# Patient Record
Sex: FEMALE | Race: WHITE | Employment: OTHER | URBAN - METROPOLITAN AREA
[De-identification: names, ages, dates, MRNs, and addresses within clinical notes are randomized per-mention and may not be internally consistent; named-entity substitution may affect disease eponyms.]

---

## 2021-03-12 PROBLEM — E66.813 OBESITY, CLASS III, BMI 40-49.9 (MORBID OBESITY): Status: ACTIVE | Noted: 2021-03-12

## 2021-11-18 PROBLEM — F50.819 BINGE EATING DISORDER: Status: ACTIVE | Noted: 2021-11-18

## 2023-03-15 PROBLEM — F50.819 BINGE EATING DISORDER: Status: RESOLVED | Noted: 2021-11-18 | Resolved: 2023-03-15

## 2024-07-11 ENCOUNTER — TELEPHONE (OUTPATIENT)
Age: 29
End: 2024-07-11

## 2024-07-11 NOTE — TELEPHONE ENCOUNTER
Pt called in , she stated hat she had changed her last name, but not reflected on my chart. Her last name on her insurance is correct and is now having an issue because  of the mismatch could not get her breast pump. Please kindly reach out to pt. Thank you

## 2024-07-29 ENCOUNTER — TELEPHONE (OUTPATIENT)
Age: 29
End: 2024-07-29

## 2024-07-30 ENCOUNTER — ROUTINE PRENATAL (OUTPATIENT)
Age: 29
End: 2024-07-30
Payer: COMMERCIAL

## 2024-07-30 VITALS — SYSTOLIC BLOOD PRESSURE: 116 MMHG | DIASTOLIC BLOOD PRESSURE: 78 MMHG | BODY MASS INDEX: 45.35 KG/M2 | WEIGHT: 256 LBS

## 2024-07-30 DIAGNOSIS — E66.01 OBESITY, CLASS III, BMI 40-49.9 (MORBID OBESITY) (HCC): ICD-10-CM

## 2024-07-30 DIAGNOSIS — Z34.83 ENCOUNTER FOR SUPERVISION OF OTHER NORMAL PREGNANCY, THIRD TRIMESTER: Primary | ICD-10-CM

## 2024-07-30 DIAGNOSIS — Z3A.36 36 WEEKS GESTATION OF PREGNANCY: ICD-10-CM

## 2024-07-30 LAB
SL AMB  POCT GLUCOSE, UA: NEGATIVE
SL AMB POCT URINE PROTEIN: NEGATIVE

## 2024-07-30 PROCEDURE — PNV: Performed by: STUDENT IN AN ORGANIZED HEALTH CARE EDUCATION/TRAINING PROGRAM

## 2024-07-30 PROCEDURE — 87184 SC STD DISK METHOD PER PLATE: CPT | Performed by: STUDENT IN AN ORGANIZED HEALTH CARE EDUCATION/TRAINING PROGRAM

## 2024-07-30 PROCEDURE — 81002 URINALYSIS NONAUTO W/O SCOPE: CPT | Performed by: STUDENT IN AN ORGANIZED HEALTH CARE EDUCATION/TRAINING PROGRAM

## 2024-07-30 PROCEDURE — 87150 DNA/RNA AMPLIFIED PROBE: CPT | Performed by: STUDENT IN AN ORGANIZED HEALTH CARE EDUCATION/TRAINING PROGRAM

## 2024-07-30 NOTE — PROGRESS NOTES
Pt is here for routine ob visit   No concerns at this time  Urine neg/neg   No LOF,VB  Clayton Calabrese Contractions  +FM   Declined tdap   GBS collected today   Delivery consent signed at previous visit

## 2024-07-30 NOTE — ASSESSMENT & PLAN NOTE
Declines glucose testing. Aware of risk of GDM and potential for  hypoglycemia, including testing and potential NICU admission

## 2024-07-30 NOTE — PROGRESS NOTES
28 y.o.  at 36w3d, here for routine OB visit. Feeling well overall and without concerns. Good FM. Denies LOF, VB, contractions. Denies dysuria, hematuria.     Problem List Items Addressed This Visit          Obstetrics/Gynecology    36 weeks gestation of pregnancy     -precautions reviewed  -birth plan previously filled out without pediatrician, returned to patient to redo. Will also need her personal birth plan signed prior to arrival at the hospital. She will scan in or bring to next visit  -prepregnancy BMI 27 with goal weight gain 15-25#: TWG = #, recommended walking 5+ time per week              Other    Obesity, Class III, BMI 40-49.9 (morbid obesity) (HCC)     Declines glucose testing. Aware of risk of GDM and potential for  hypoglycemia, including testing and potential NICU admission          Other Visit Diagnoses       Encounter for supervision of other normal pregnancy, third trimester    -  Primary    Relevant Orders    POCT urine dip (Completed)    Strep B DNA probe, amplification

## 2024-07-30 NOTE — ASSESSMENT & PLAN NOTE
-precautions reviewed  -birth plan previously filled out without pediatrician, returned to patient to redo. Will also need her personal birth plan signed prior to arrival at the hospital. She will scan in or bring to next visit  -prepregnancy BMI 27 with goal weight gain 15-25#: TWG = #, recommended walking 5+ time per week

## 2024-08-01 LAB — GP B STREP DNA SPEC QL NAA+PROBE: POSITIVE

## 2024-08-02 ENCOUNTER — OFFICE VISIT (OUTPATIENT)
Age: 29
End: 2024-08-02
Payer: COMMERCIAL

## 2024-08-02 VITALS
RESPIRATION RATE: 18 BRPM | OXYGEN SATURATION: 97 % | BODY MASS INDEX: 45.35 KG/M2 | HEIGHT: 63 IN | HEART RATE: 114 BPM | TEMPERATURE: 97.5 F | SYSTOLIC BLOOD PRESSURE: 120 MMHG | DIASTOLIC BLOOD PRESSURE: 80 MMHG

## 2024-08-02 DIAGNOSIS — J06.9 VIRAL URI WITH COUGH: Primary | ICD-10-CM

## 2024-08-02 PROCEDURE — 99213 OFFICE O/P EST LOW 20 MIN: CPT | Performed by: FAMILY MEDICINE

## 2024-08-02 RX ORDER — IPRATROPIUM BROMIDE 21 UG/1
2 SPRAY, METERED NASAL EVERY 12 HOURS
Qty: 30 ML | Refills: 0 | Status: SHIPPED | OUTPATIENT
Start: 2024-08-02

## 2024-08-02 NOTE — PROGRESS NOTES
"[facility] PRIMARY CARE  Ambulatory visit     Name: Kelly Gabriel   YOB: 1995   MRN: [id number]  Encounter Provider: Jean Claude Sanchez MD    Encounter Date: 8/2/2024    ASSESSMENT & PLAN    Assessment & Plan     Sore throat/cough  Started 4-5 days ago, it has stayed the same. Sleep has been an issue since the start of the cough and sore throat.  Currently 36 weeks and 6-day pregnant.   Exam essentially unremarkable.  Patient would like something for treatment of cough, provided Atrovent nasal spray up to 2 times a day as needed.  Avoid sleeping on her back, advised belly breathing to expand all lung fields, may use Vicks topically and lidocaine spray for throat from over-the-counter.  Discussed return parameters              DIAGNOSIS & ORDERS   1. Viral URI with cough  -     ipratropium (ATROVENT) 0.03 % nasal spray; 2 sprays into each nostril every 12 (twelve) hours    FOLLOW-UP PLANS   No follow-ups on file.    Current Medication List:     Current Outpatient Medications:   •  CALCIUM-MAG-VIT C-VIT D PO, Take by mouth, Disp: , Rfl:   •  ipratropium (ATROVENT) 0.03 % nasal spray, 2 sprays into each nostril every 12 (twelve) hours, Disp: 30 mL, Rfl: 0  •  Prenatal MV-Min-Fe Fum-FA-DHA (Prenatal Multivitamin Plus DHA) 27-0.8-250 MG CAPS, 1 capsule p.o. daily, Disp: 30 capsule, Rfl: 12  •  Lancets (onetouch ultrasoft) lancets, Use as instructed (Patient not taking: Reported on 7/2/2024), Disp: 30 each, Rfl: 0  Subjective   History of Present Illness       Kelly Gabriel is here for an office visit.   HPI    Review of Systems    Objective:     /80 (BP Location: Left arm, Patient Position: Sitting, Cuff Size: Standard)   Pulse (!) 114   Temp 97.5 °F (36.4 °C) (Tympanic)   Resp 18   Ht 5' 3\" (1.6 m)   LMP 11/27/2023 (Exact Date)   SpO2 97%   BMI 45.35 kg/m²      Physical Exam  Vitals reviewed.   Constitutional:       General: She is not in acute distress.     Appearance: Normal " appearance. She is not ill-appearing, toxic-appearing or diaphoretic.   HENT:      Head: Normocephalic and atraumatic.      Right Ear: External ear normal.      Left Ear: External ear normal.      Nose: Nose normal.      Mouth/Throat:      Mouth: Mucous membranes are moist. No oral lesions.      Pharynx: Pharyngeal swelling present. No oropharyngeal exudate, posterior oropharyngeal erythema or uvula swelling.      Tonsils: No tonsillar exudate or tonsillar abscesses.   Eyes:      General: No scleral icterus.        Right eye: No discharge.         Left eye: No discharge.      Extraocular Movements: Extraocular movements intact.      Conjunctiva/sclera: Conjunctivae normal.   Cardiovascular:      Rate and Rhythm: Normal rate and regular rhythm.      Pulses: Normal pulses.      Heart sounds: Normal heart sounds.   Pulmonary:      Effort: Pulmonary effort is normal. No respiratory distress.      Breath sounds: Normal breath sounds. No wheezing or rales.   Abdominal:      Palpations: Abdomen is soft.      Tenderness: There is no abdominal tenderness.   Musculoskeletal:         General: No swelling. Normal range of motion.      Cervical back: Normal range of motion.   Skin:     General: Skin is warm and dry.   Neurological:      General: No focal deficit present.      Mental Status: She is alert and oriented to person, place, and time.   Psychiatric:         Mood and Affect: Mood normal.         Behavior: Behavior normal.         Thought Content: Thought content normal.           Jean Claude Sanchez MD  Family Medicine Physician   [facility]        Administrative Statements

## 2024-08-04 LAB
GP B STREP DNA SPEC QL NAA+PROBE: ABNORMAL
GP B STREP DNA SPEC QL NAA+PROBE: ABNORMAL

## 2024-08-05 PROBLEM — B95.1 POSITIVE GBS TEST: Status: ACTIVE | Noted: 2024-08-05

## 2024-08-05 PROBLEM — Z3A.37 37 WEEKS GESTATION OF PREGNANCY: Status: ACTIVE | Noted: 2024-02-13

## 2024-08-07 ENCOUNTER — TELEPHONE (OUTPATIENT)
Age: 29
End: 2024-08-07

## 2024-08-07 PROBLEM — Z3A.38 38 WEEKS GESTATION OF PREGNANCY: Status: ACTIVE | Noted: 2024-02-13

## 2024-08-07 PROBLEM — N93.9 VAGINAL SPOTTING: Status: RESOLVED | Noted: 2024-07-15 | Resolved: 2024-08-07

## 2024-08-07 NOTE — TELEPHONE ENCOUNTER
Pt calling to let office staff know that [employer] had faxed Breast Pump form. [employer] company asked patient to alert staff to ensure receipt. No further questions.

## 2024-08-07 NOTE — PATIENT INSTRUCTIONS
Patient Education     Pregnancy - The Ninth Month   About this topic   It is important for you to learn how to take care of yourself to help you have a healthy baby and safe delivery. It is good to have health care throughout your pregnancy.  The ninth month of your pregnancy starts around week 37 and lasts through delivery. By knowing how far along you are, you can learn what is normal for this stage of your pregnancy and plan for what is next.  General   Your body   During the ninth month of your pregnancy, here are some things you can expect.  You may:  Lose your mucous plug as your cervix starts to get thinner and dilate.  Notice a small amount of streaky red or pink spotting.  Your doctor may discuss stripping your membranes to help the labor progress.  Go into labor any time. Most women deliver their baby between 38 and 42 weeks.  Notice your belly button sticks out. It should go back to normal after you give birth.  Have a bit of extra energy as you get ready for your baby  Notice your breasts are leaking more. This is normal as your body is making the first milk you can feed your baby.  Have tests to check on how your baby is doing. Your doctor will most likely not let you be pregnant for more than 42 weeks.  Not gain any weight this month. Some mothers even lose 1 to 2 pounds (.45 to .9 kg).  Your baby's growth and development:  Your baby has been busy swallowing fluid and building up waste products for their first bowel movement.  Your baby may start sucking their thumb.  They may come out with dry skin, bruises, or a misshapen head. Living in a watery fluid and going through labor is tough on your baby too. These are all normal and will change in the first weeks of life.  Your baby may have lots of hair on their head or not very much at all. Long fingernails are normal.  Your baby is about 20 inches (51 cm) long and weighs about 7 1/2 pounds (3,400 gm). Your baby is about the size of a watermelon.  Things  to Think About   Avoid alcohol, drugs, tobacco products, and second hand smoke.  Talk to your doctor if you plan to travel or get on a plane.  Have your bag packed so you are ready for delivery.  Are you planning a natural childbirth or thinking about an epidural? Know things you can do to help cope with labor pain.  If you are having a boy, decide if you want to have him circumcised.  Be sure the car seat is installed the right way so you are ready to bring your baby home.  When do I need to call the doctor?   Contractions every 5 minutes or more often that do not go away with rest, drinking water, or position changes  Headache that does not go away; blurry vision; seeing spots or halos; increase in swelling in your hands, feet, or face; and pain under your ribs on the right side  Low, dull back pain that does not go away  Pressure in your pelvis that feels like your baby is pushing down  A gush or constant trickle of watery or bloody fluid leaking from your vagina  Little to no movement felt by baby in 2 hours. Your baby should be moving at least 10 times every 2 hours.  Vaginal bleeding with or without pain  Fever of 100.4°F (38°C) or higher  After a car accident, fall, or any trauma to your belly  Having thoughts of harming yourself or others, or do not feel safe at home  Last Reviewed Date   2020-05-06  Consumer Information Use and Disclaimer   This generalized information is a limited summary of diagnosis, treatment, and/or medication information. It is not meant to be comprehensive and should be used as a tool to help the user understand and/or assess potential diagnostic and treatment options. It does NOT include all information about conditions, treatments, medications, side effects, or risks that may apply to a specific patient. It is not intended to be medical advice or a substitute for the medical advice, diagnosis, or treatment of a health care provider based on the health care provider's examination  and assessment of a patient’s specific and unique circumstances. Patients must speak with a health care provider for complete information about their health, medical questions, and treatment options, including any risks or benefits regarding use of medications. This information does not endorse any treatments or medications as safe, effective, or approved for treating a specific patient. UpToDate, Inc. and its affiliates disclaim any warranty or liability relating to this information or the use thereof. The use of this information is governed by the Terms of Use, available at https://www.woltersSportboomuwer.com/en/know/clinical-effectiveness-terms   Copyright   Copyright © 2024 UpToDate, Inc. and its affiliates and/or licensors. All rights reserved.

## 2024-08-12 ENCOUNTER — ROUTINE PRENATAL (OUTPATIENT)
Dept: OBGYN CLINIC | Facility: CLINIC | Age: 29
End: 2024-08-12

## 2024-08-12 VITALS
BODY MASS INDEX: 45.53 KG/M2 | TEMPERATURE: 87 F | DIASTOLIC BLOOD PRESSURE: 80 MMHG | SYSTOLIC BLOOD PRESSURE: 116 MMHG | WEIGHT: 257 LBS | OXYGEN SATURATION: 97 %

## 2024-08-12 DIAGNOSIS — Z3A.38 38 WEEKS GESTATION OF PREGNANCY: ICD-10-CM

## 2024-08-12 DIAGNOSIS — E66.01 SEVERE OBESITY DUE TO EXCESS CALORIES AFFECTING PREGNANCY IN THIRD TRIMESTER (HCC): ICD-10-CM

## 2024-08-12 DIAGNOSIS — Z34.83 ENCOUNTER FOR SUPERVISION OF OTHER NORMAL PREGNANCY, THIRD TRIMESTER: Primary | ICD-10-CM

## 2024-08-12 DIAGNOSIS — O99.213 SEVERE OBESITY DUE TO EXCESS CALORIES AFFECTING PREGNANCY IN THIRD TRIMESTER (HCC): ICD-10-CM

## 2024-08-12 DIAGNOSIS — B95.1 POSITIVE GBS TEST: ICD-10-CM

## 2024-08-12 PROCEDURE — PNV: Performed by: NURSE PRACTITIONER

## 2024-08-12 NOTE — PROGRESS NOTES
Problem   38 Weeks Gestation of Pregnancy    Blood Type: O positive  Pap NILM  GC/CT -  negative  PN1 Labs- nml  28 Week Labs- CBC RPR nml, DECLINES glucose testing. Counseled  TW.3 kg (25 lb)     Genetic screening-  NIPS not done  AFP-  done, incorrect window  Level 1- 24  Level 2-   FG recommended to be done at 28 wks, declined a repeat, financial constraints  NSTs declined a repeat d/t financial constraints  Yellow folder-given  TDAP - declined    Delivery consent- signed.  We discussed that we are a teaching hospital and cannot follow her request to not allow resident physicians participate in her care.  She states she is just concerned about too many people in the room which I told her could be limited to the best of our abilities.  I also explained that videography is not allowed during her labor which she understood.  Vitamin K is declined which she will discuss with the staff on labor and delivery.  Breast pump - ordered, having issues getting it approved. Will get us a copy of her letter  Pediatrician - selected [facility]  L&D forms- signed and submitted  IOL declined today, given info on induction     Perineal massage - reinforced  GBS swab - POSITIVE             38 weeks gestation of pregnancy  She feels well. She denies LOF/CTX/VB. She did not voice any concerns. Discussed fetal kick counting.  She was encouraged to start with her perineal/vaginal massages to prevent lacerations during the labor process.  Overview charting updated today.

## 2024-08-12 NOTE — ASSESSMENT & PLAN NOTE
She feels well. She denies LOF/CTX/VB. She did not voice any concerns. Discussed fetal kick counting.  She was encouraged to start with her perineal/vaginal massages to prevent lacerations during the labor process.  Overview charting updated today.

## 2024-08-13 ENCOUNTER — TELEPHONE (OUTPATIENT)
Age: 29
End: 2024-08-13

## 2024-08-13 NOTE — TELEPHONE ENCOUNTER
Incoming call from patient. Patient states she is receiving run-around regarding breast pump order. Please see scan from 8/7 in media - form needs to be filled out and sent back to Kaylee.     Routing to clinical pool for follow-up.

## 2024-08-19 PROBLEM — Z3A.39 39 WEEKS GESTATION OF PREGNANCY: Status: ACTIVE | Noted: 2024-02-13

## 2024-08-19 NOTE — PATIENT INSTRUCTIONS
Patient Education     Pregnancy - The Ninth Month   About this topic   It is important for you to learn how to take care of yourself to help you have a healthy baby and safe delivery. It is good to have health care throughout your pregnancy.  The ninth month of your pregnancy starts around week 37 and lasts through delivery. By knowing how far along you are, you can learn what is normal for this stage of your pregnancy and plan for what is next.  General   Your body   During the ninth month of your pregnancy, here are some things you can expect.  You may:  Lose your mucous plug as your cervix starts to get thinner and dilate.  Notice a small amount of streaky red or pink spotting.  Your doctor may discuss stripping your membranes to help the labor progress.  Go into labor any time. Most women deliver their baby between 38 and 42 weeks.  Notice your belly button sticks out. It should go back to normal after you give birth.  Have a bit of extra energy as you get ready for your baby  Notice your breasts are leaking more. This is normal as your body is making the first milk you can feed your baby.  Have tests to check on how your baby is doing. Your doctor will most likely not let you be pregnant for more than 42 weeks.  Not gain any weight this month. Some mothers even lose 1 to 2 pounds (.45 to .9 kg).  Your baby's growth and development:  Your baby has been busy swallowing fluid and building up waste products for their first bowel movement.  Your baby may start sucking their thumb.  They may come out with dry skin, bruises, or a misshapen head. Living in a watery fluid and going through labor is tough on your baby too. These are all normal and will change in the first weeks of life.  Your baby may have lots of hair on their head or not very much at all. Long fingernails are normal.  Your baby is about 20 inches (51 cm) long and weighs about 7 1/2 pounds (3,400 gm). Your baby is about the size of a watermelon.  Things  to Think About   Avoid alcohol, drugs, tobacco products, and second hand smoke.  Talk to your doctor if you plan to travel or get on a plane.  Have your bag packed so you are ready for delivery.  Are you planning a natural childbirth or thinking about an epidural? Know things you can do to help cope with labor pain.  If you are having a boy, decide if you want to have him circumcised.  Be sure the car seat is installed the right way so you are ready to bring your baby home.  When do I need to call the doctor?   Contractions every 5 minutes or more often that do not go away with rest, drinking water, or position changes  Headache that does not go away; blurry vision; seeing spots or halos; increase in swelling in your hands, feet, or face; and pain under your ribs on the right side  Low, dull back pain that does not go away  Pressure in your pelvis that feels like your baby is pushing down  A gush or constant trickle of watery or bloody fluid leaking from your vagina  Little to no movement felt by baby in 2 hours. Your baby should be moving at least 10 times every 2 hours.  Vaginal bleeding with or without pain  Fever of 100.4°F (38°C) or higher  After a car accident, fall, or any trauma to your belly  Having thoughts of harming yourself or others, or do not feel safe at home  Last Reviewed Date   2020-05-06  Consumer Information Use and Disclaimer   This generalized information is a limited summary of diagnosis, treatment, and/or medication information. It is not meant to be comprehensive and should be used as a tool to help the user understand and/or assess potential diagnostic and treatment options. It does NOT include all information about conditions, treatments, medications, side effects, or risks that may apply to a specific patient. It is not intended to be medical advice or a substitute for the medical advice, diagnosis, or treatment of a health care provider based on the health care provider's examination  and assessment of a patient’s specific and unique circumstances. Patients must speak with a health care provider for complete information about their health, medical questions, and treatment options, including any risks or benefits regarding use of medications. This information does not endorse any treatments or medications as safe, effective, or approved for treating a specific patient. UpToDate, Inc. and its affiliates disclaim any warranty or liability relating to this information or the use thereof. The use of this information is governed by the Terms of Use, available at https://www.woltersNoteWagonuwer.com/en/know/clinical-effectiveness-terms   Copyright   Copyright © 2024 UpToDate, Inc. and its affiliates and/or licensors. All rights reserved.

## 2024-08-20 ENCOUNTER — ROUTINE PRENATAL (OUTPATIENT)
Age: 29
End: 2024-08-20
Payer: COMMERCIAL

## 2024-08-20 VITALS
DIASTOLIC BLOOD PRESSURE: 78 MMHG | HEIGHT: 63 IN | SYSTOLIC BLOOD PRESSURE: 126 MMHG | BODY MASS INDEX: 45.75 KG/M2 | WEIGHT: 258.2 LBS | HEART RATE: 109 BPM | OXYGEN SATURATION: 97 %

## 2024-08-20 DIAGNOSIS — B95.1 POSITIVE GBS TEST: ICD-10-CM

## 2024-08-20 DIAGNOSIS — O99.210 OBESITY AFFECTING PREGNANCY, ANTEPARTUM, UNSPECIFIED OBESITY TYPE: ICD-10-CM

## 2024-08-20 DIAGNOSIS — Z34.83 PRENATAL CARE, SUBSEQUENT PREGNANCY IN THIRD TRIMESTER: Primary | ICD-10-CM

## 2024-08-20 DIAGNOSIS — Z3A.39 39 WEEKS GESTATION OF PREGNANCY: ICD-10-CM

## 2024-08-20 LAB
SL AMB  POCT GLUCOSE, UA: ABNORMAL
SL AMB POCT URINE PROTEIN: ABNORMAL

## 2024-08-20 PROCEDURE — PNV: Performed by: NURSE PRACTITIONER

## 2024-08-20 PROCEDURE — 81002 URINALYSIS NONAUTO W/O SCOPE: CPT | Performed by: NURSE PRACTITIONER

## 2024-08-20 NOTE — PROGRESS NOTES
Problem   39 Weeks Gestation of Pregnancy    Blood Type: O positive  Pap NILM  GC/CT -  negative  PN1 Labs- nml  28 Week Labs- CBC RPR nml, DECLINES glucose testing. Counseled  TW.9 kg (26 lb 3.2 oz)     Genetic screening-  NIPS not done  AFP-  done, incorrect window  Level 1- 24  Level 2-   FG recommended to be done at 28 wks, declined a repeat, financial constraints  NSTs declined a repeat d/t financial constraints  Yellow folder-given  TDAP - declined    Delivery consent- signed.  We discussed that we are a teaching hospital and cannot follow her request to not allow resident physicians participate in her care.  She states she is just concerned about too many people in the room which I told her could be limited to the best of our abilities.  I also explained that videography is not allowed during her labor which she understood.  Vitamin K is declined which she will discuss with the staff on labor and delivery.  Breast pump - ordered, having issues getting it approved. Resubmitted.  Pediatrician - selected [facility]  L&D forms- signed and submitted  IOL declined again today     Perineal massage - reinforced  GBS swab - POSITIVE             39 weeks gestation of pregnancy  Here with FOB. She feels well. She denies LOF/CTX/VB. She did not voice any concerns. Discussed fetal kick counting.  She was encouraged to continue with her perineal/vaginal massages to prevent lacerations during the labor process. Advised to increase fluids on the warmer days, concentrated urine today.    Overview charting updated today.

## 2024-08-20 NOTE — ASSESSMENT & PLAN NOTE
Here with FOB. She feels well. She denies LOF/CTX/VB. She did not voice any concerns. Discussed fetal kick counting.  She was encouraged to continue with her perineal/vaginal massages to prevent lacerations during the labor process. Advised to increase fluids on the warmer days, concentrated urine today.    Overview charting updated today.

## 2024-08-26 ENCOUNTER — HOSPITAL ENCOUNTER (OUTPATIENT)
Facility: HOSPITAL | Age: 29
Discharge: HOME | End: 2024-08-26
Attending: STUDENT IN AN ORGANIZED HEALTH CARE EDUCATION/TRAINING PROGRAM | Admitting: STUDENT IN AN ORGANIZED HEALTH CARE EDUCATION/TRAINING PROGRAM
Payer: COMMERCIAL

## 2024-08-26 ENCOUNTER — NURSE TRIAGE (OUTPATIENT)
Dept: OTHER | Facility: OTHER | Age: 29
End: 2024-08-26

## 2024-08-26 VITALS
WEIGHT: 258 LBS | HEIGHT: 63 IN | TEMPERATURE: 98.2 F | HEART RATE: 96 BPM | BODY MASS INDEX: 45.71 KG/M2 | OXYGEN SATURATION: 97 % | RESPIRATION RATE: 18 BRPM | DIASTOLIC BLOOD PRESSURE: 82 MMHG | SYSTOLIC BLOOD PRESSURE: 124 MMHG

## 2024-08-26 PROBLEM — Z3A.40 40 WEEKS GESTATION OF PREGNANCY: Status: ACTIVE | Noted: 2024-02-13

## 2024-08-26 PROCEDURE — 76815 OB US LIMITED FETUS(S): CPT | Performed by: STUDENT IN AN ORGANIZED HEALTH CARE EDUCATION/TRAINING PROGRAM

## 2024-08-26 PROCEDURE — NC001 PR NO CHARGE: Performed by: STUDENT IN AN ORGANIZED HEALTH CARE EDUCATION/TRAINING PROGRAM

## 2024-08-26 PROCEDURE — 76815 OB US LIMITED FETUS(S): CPT

## 2024-08-26 PROCEDURE — 99213 OFFICE O/P EST LOW 20 MIN: CPT

## 2024-08-26 PROCEDURE — 59025 FETAL NON-STRESS TEST: CPT

## 2024-08-26 NOTE — PROGRESS NOTES
"L&D Triage Note - OB/GYN  Kelly Gabriel 28 y.o. female MRN: [id number]  Unit/Bed#:  TRIAGE  Encounter: [id number]        Patient is seen by [facility]    ASSESSMENT/PLAN  Kelly Gabriel is a 28 y.o.  at 40w2d who presents with contractions, not currently in labor.      1) r/o labor  - SVE minimally changed at 3-50/-3, after membrane stripping and 2 hour recheck  - Patient would like to avoid induction of labor, did emphasize that there is minimal benefit to staying pregnant past 40 weeks  - Vitals wnl  - NST reactive, GABRIEL 19 cm      2)  Discharge instructions  - Patient instructed to call if experiencing worsening contractions, vaginal bleeding, loss of fluid or decreased fetal movement.  - Will follow up with OBGYN in office this Wednesday    D/w Dr. Devine  ______________    SUBJECTIVE    SANDRA: Estimated Date of Delivery: 24    HPI:  28 y.o.  40w2d presents with complaint of worsening contractions. Feels like they were initially about 6-10 min apart earlier today but now feel around 5 min apart.     Contractions: yes  Leakage of fluid: no  Vaginal Bleeding: no  Fetal movement: present    Her obstetrical history is significant for GBS positive, BMI 45    ROS:  Constitutional: Negative  Respiratory: Negative  Cardiovascular: Negative    Gastrointestinal: Negative    Physical Exam  GEN: Well appearing, no apparent distress   ABD: Gravid, soft  SVE: Cervical Dilation: 3-4  Cervical Effacement: 50  Cervical Consistency: Medium  Fetal Station: -3  Presentation: Vertex  Method: Manual  OB Examiner: Xochitl    OBJECTIVE:  /82 (BP Location: Right arm)   Pulse 96   Temp 98.2 °F (36.8 °C) (Oral)   Resp 18   Ht 5' 3\" (1.6 m)   Wt 117 kg (258 lb)   LMP 2023 (Exact Date)   SpO2 97%   BMI 45.70 kg/m²   Body mass index is 45.7 kg/m².  Labs: No results found for this or any previous visit (from the past 24 hour(s)).      SVE:  Cervical Dilation: 3-4  Cervical " "Effacement: 50  Cervical Consistency: Medium  Fetal Station: -3  Presentation: Vertex  Method: Manual  OB Examiner: Xochitl    FHT:  Baseline Rate (FHR): 135 bpm  Variability: Moderate  Accelerations: 15 x 15 or greater  Decelerations: None    TOCO:   Contraction Frequency (minutes): 2-5 (inverted)  Contraction Duration (seconds): 60-90  Contraction Intensity: Mild    IMAGING:        TAUS  4 Quadrant GABRIEL  GABRIEL Q1 (cm): 5.5 cm  GABRIEL Q2 (cm): 5.2 cm  GABRIEL Q3 (cm): 7.7 cm  GABRIEL Q4 (cm): 0.7 cm  GABRIEL TOTAL (cm): 19.1 cm    Mercedes Leung MD  OB/GYN PGY-4  8/26/2024  7:37 AM      Portions of the record may have been created with voice recognition software.  Occasional wrong word or \"sound a like\" substitutions may have occurred due to the inherent limitations of voice recognition software.  Read the chart carefully and recognize, using context, where substitutions have occurred    "

## 2024-08-26 NOTE — TELEPHONE ENCOUNTER
"Reason for Disposition   [1] First baby (primipara) AND [2] contractions < 6 minutes apart  AND [3] present 2 hours    Answer Assessment - Initial Assessment Questions  1. ONSET: \"When did the symptoms begin?\"         Saturday morning    2. CONTRACTIONS: \"Describe the contractions that you are having.\" (e.g., duration, frequency, regularity, severity)      6-8 minutes over the last hour. Lasting 30 seconds. Since Saturday morning having contractions about every 11 minutes.     3. SANDRA: \"What date are you expecting to deliver?\"      08/24/2024    4. PARITY: \"Have you had a baby before?\" If Yes, ask: \"How long did the labor last?\"      First baby    5. FETAL MOVEMENT: \"Has the baby's movement decreased or changed significantly from normal?\"      Good FM    6. OTHER SYMPTOMS: \"Do you have any other symptoms?\" (e.g., leaking fluid from vagina, vaginal bleeding, fever, hand/facial swelling)      Mucous plug coming out. Feeling lots of vaginal pressure.    Sent ESC to on-call provider for recommendations. Provider recommended triage for evaluation. Reviewed recommendations with patient and she is agreeable in plan. She will arrive in 1 hour.    Protocols used: Pregnancy - Labor-ADULT-AH    "

## 2024-08-26 NOTE — PROCEDURES
Kelly Gabriel, a  at 40w2d with an SANDRA of 2024, by Ultrasound, [facility] for the following procedure(s): $Procedure Type: GABRIEL, NST]    Nonstress Test  Reason for NST: Routine  Variability: Moderate  Decelerations: None  Accelerations: Yes  Acoustic Stimulator: No  Baseline: 135 BPM  Uterine Irritability: No  Contractions: Irregular  Contraction Frequency (minutes): 5 min    4 Quadrant GABRIEL  GABRIEL Q1 (cm): 5.5 cm  GABRIEL Q2 (cm): 5.2 cm  GABRIEL Q3 (cm): 7.7 cm  GABRIEL Q4 (cm): 0.7 cm  GABRIEL TOTAL (cm): 19.1 cm              Interpretation  Nonstress Test Interpretation: Reactive  Overall Impression: Reassuring

## 2024-08-27 ENCOUNTER — ANESTHESIA (INPATIENT)
Dept: ANESTHESIOLOGY | Facility: HOSPITAL | Age: 29
End: 2024-08-27
Payer: COMMERCIAL

## 2024-08-27 ENCOUNTER — ANESTHESIA EVENT (INPATIENT)
Dept: ANESTHESIOLOGY | Facility: HOSPITAL | Age: 29
End: 2024-08-27
Payer: COMMERCIAL

## 2024-08-27 ENCOUNTER — HOSPITAL ENCOUNTER (INPATIENT)
Facility: HOSPITAL | Age: 29
LOS: 2 days | Discharge: HOME | End: 2024-08-29
Attending: STUDENT IN AN ORGANIZED HEALTH CARE EDUCATION/TRAINING PROGRAM | Admitting: STUDENT IN AN ORGANIZED HEALTH CARE EDUCATION/TRAINING PROGRAM
Payer: COMMERCIAL

## 2024-08-27 ENCOUNTER — NURSE TRIAGE (OUTPATIENT)
Age: 29
End: 2024-08-27

## 2024-08-27 DIAGNOSIS — O47.9 UTERINE CONTRACTIONS AT GREATER THAN 20 WEEKS OF GESTATION: ICD-10-CM

## 2024-08-27 PROBLEM — O14.03 MILD PRE-ECLAMPSIA IN THIRD TRIMESTER: Status: ACTIVE | Noted: 2024-08-27

## 2024-08-27 PROBLEM — R03.0 ELEVATED BP WITHOUT DIAGNOSIS OF HYPERTENSION: Status: ACTIVE | Noted: 2024-08-27

## 2024-08-27 LAB
ABO GROUP BLD: NORMAL
ALBUMIN SERPL BCG-MCNC: 3.7 G/DL (ref 3.5–5)
ALP SERPL-CCNC: 114 U/L (ref 34–104)
ALT SERPL W P-5'-P-CCNC: 12 U/L (ref 7–52)
ANION GAP SERPL CALCULATED.3IONS-SCNC: 12 MMOL/L (ref 4–13)
AST SERPL W P-5'-P-CCNC: 18 U/L (ref 13–39)
BASE EXCESS BLDCOA CALC-SCNC: -6.8 MMOL/L (ref 3–11)
BASE EXCESS BLDCOV CALC-SCNC: -5.8 MMOL/L (ref 1–9)
BILIRUB SERPL-MCNC: 0.53 MG/DL (ref 0.2–1)
BLD GP AB SCN SERPL QL: NEGATIVE
BUN SERPL-MCNC: 17 MG/DL (ref 5–25)
CALCIUM SERPL-MCNC: 9.2 MG/DL (ref 8.4–10.2)
CHLORIDE SERPL-SCNC: 104 MMOL/L (ref 96–108)
CO2 SERPL-SCNC: 18 MMOL/L (ref 21–32)
CREAT SERPL-MCNC: 0.74 MG/DL (ref 0.6–1.3)
CREAT UR-MCNC: 355.2 MG/DL
ERYTHROCYTE [DISTWIDTH] IN BLOOD BY AUTOMATED COUNT: 13.2 % (ref 11.6–15.1)
GFR SERPL CREATININE-BSD FRML MDRD: 109 ML/MIN/1.73SQ M
GLUCOSE SERPL-MCNC: 75 MG/DL (ref 65–140)
GLUCOSE SERPL-MCNC: 90 MG/DL (ref 65–140)
HCO3 BLDCOA-SCNC: 22 MMOL/L (ref 17.3–27.3)
HCO3 BLDCOV-SCNC: 20 MMOL/L (ref 12.2–28.6)
HCT VFR BLD AUTO: 41 % (ref 34.8–46.1)
HGB BLD-MCNC: 13.9 G/DL (ref 11.5–15.4)
HOLD SPECIMEN: NORMAL
MCH RBC QN AUTO: 28.7 PG (ref 26.8–34.3)
MCHC RBC AUTO-ENTMCNC: 33.9 G/DL (ref 31.4–37.4)
MCV RBC AUTO: 85 FL (ref 82–98)
O2 CT VFR BLDCOA CALC: 3.2 ML/DL
OXYHGB MFR BLDCOA: 14.1 %
OXYHGB MFR BLDCOV: 45.6 %
PCO2 BLDCOA: 57 MM[HG] (ref 30–60)
PCO2 BLDCOV: 40.3 MM HG (ref 27–43)
PH BLDCOA: 7.21 [PH] (ref 7.23–7.43)
PH BLDCOV: 7.31 [PH] (ref 7.19–7.49)
PLATELET # BLD AUTO: 279 THOUSANDS/UL (ref 149–390)
PMV BLD AUTO: 10.2 FL (ref 8.9–12.7)
PO2 BLDCOA: 11.4 MM HG (ref 5–25)
PO2 BLDCOV: 21.9 MM HG (ref 15–45)
POTASSIUM SERPL-SCNC: 4 MMOL/L (ref 3.5–5.3)
PROT SERPL-MCNC: 6.6 G/DL (ref 6.4–8.4)
PROT UR-MCNC: 239 MG/DL
PROT/CREAT UR: 0.7 MG/G{CREAT} (ref 0–0.1)
RBC # BLD AUTO: 4.84 MILLION/UL (ref 3.81–5.12)
RH BLD: POSITIVE
SAO2 % BLDCOV: 10.2 ML/DL
SODIUM SERPL-SCNC: 134 MMOL/L (ref 135–147)
SPECIMEN EXPIRATION DATE: NORMAL
TREPONEMA PALLIDUM IGG+IGM AB [PRESENCE] IN SERUM OR PLASMA BY IMMUNOASSAY: NORMAL
WBC # BLD AUTO: 13.43 THOUSAND/UL (ref 4.31–10.16)

## 2024-08-27 PROCEDURE — 82570 ASSAY OF URINE CREATININE: CPT

## 2024-08-27 PROCEDURE — NC001 PR NO CHARGE: Performed by: STUDENT IN AN ORGANIZED HEALTH CARE EDUCATION/TRAINING PROGRAM

## 2024-08-27 PROCEDURE — 0HQ9XZZ REPAIR PERINEUM SKIN, EXTERNAL APPROACH: ICD-10-PCS | Performed by: STUDENT IN AN ORGANIZED HEALTH CARE EDUCATION/TRAINING PROGRAM

## 2024-08-27 PROCEDURE — 86901 BLOOD TYPING SEROLOGIC RH(D): CPT

## 2024-08-27 PROCEDURE — 85027 COMPLETE CBC AUTOMATED: CPT

## 2024-08-27 PROCEDURE — 4A1HXCZ MONITORING OF PRODUCTS OF CONCEPTION, CARDIAC RATE, EXTERNAL APPROACH: ICD-10-PCS | Performed by: STUDENT IN AN ORGANIZED HEALTH CARE EDUCATION/TRAINING PROGRAM

## 2024-08-27 PROCEDURE — 86900 BLOOD TYPING SEROLOGIC ABO: CPT

## 2024-08-27 PROCEDURE — 59400 OBSTETRICAL CARE: CPT | Performed by: STUDENT IN AN ORGANIZED HEALTH CARE EDUCATION/TRAINING PROGRAM

## 2024-08-27 PROCEDURE — 86780 TREPONEMA PALLIDUM: CPT

## 2024-08-27 PROCEDURE — 99213 OFFICE O/P EST LOW 20 MIN: CPT

## 2024-08-27 PROCEDURE — 82805 BLOOD GASES W/O2 SATURATION: CPT | Performed by: STUDENT IN AN ORGANIZED HEALTH CARE EDUCATION/TRAINING PROGRAM

## 2024-08-27 PROCEDURE — 86850 RBC ANTIBODY SCREEN: CPT

## 2024-08-27 PROCEDURE — 80053 COMPREHEN METABOLIC PANEL: CPT

## 2024-08-27 PROCEDURE — 82948 REAGENT STRIP/BLOOD GLUCOSE: CPT

## 2024-08-27 PROCEDURE — 3E0R3BZ INTRODUCTION OF ANESTHETIC AGENT INTO SPINAL CANAL, PERCUTANEOUS APPROACH: ICD-10-PCS | Performed by: STUDENT IN AN ORGANIZED HEALTH CARE EDUCATION/TRAINING PROGRAM

## 2024-08-27 PROCEDURE — 84156 ASSAY OF PROTEIN URINE: CPT

## 2024-08-27 RX ORDER — BUPIVACAINE HYDROCHLORIDE 2.5 MG/ML
30 INJECTION, SOLUTION EPIDURAL; INFILTRATION; INTRACAUDAL ONCE AS NEEDED
Status: DISCONTINUED | OUTPATIENT
Start: 2024-08-27 | End: 2024-08-28

## 2024-08-27 RX ORDER — CLINDAMYCIN PHOSPHATE 900 MG/50ML
900 INJECTION, SOLUTION INTRAVENOUS EVERY 8 HOURS
Status: DISCONTINUED | OUTPATIENT
Start: 2024-08-27 | End: 2024-08-28

## 2024-08-27 RX ORDER — OXYTOCIN/RINGER'S LACTATE 30/500 ML
1-30 PLASTIC BAG, INJECTION (ML) INTRAVENOUS
Status: DISCONTINUED | OUTPATIENT
Start: 2024-08-27 | End: 2024-08-28

## 2024-08-27 RX ORDER — ONDANSETRON 2 MG/ML
4 INJECTION INTRAMUSCULAR; INTRAVENOUS EVERY 6 HOURS PRN
Status: DISCONTINUED | OUTPATIENT
Start: 2024-08-27 | End: 2024-08-28

## 2024-08-27 RX ORDER — SODIUM CHLORIDE, SODIUM LACTATE, POTASSIUM CHLORIDE, CALCIUM CHLORIDE 600; 310; 30; 20 MG/100ML; MG/100ML; MG/100ML; MG/100ML
125 INJECTION, SOLUTION INTRAVENOUS CONTINUOUS
Status: DISCONTINUED | OUTPATIENT
Start: 2024-08-27 | End: 2024-08-28

## 2024-08-27 RX ORDER — LIDOCAINE HYDROCHLORIDE AND EPINEPHRINE 15; 5 MG/ML; UG/ML
INJECTION, SOLUTION EPIDURAL AS NEEDED
Status: DISCONTINUED | OUTPATIENT
Start: 2024-08-27 | End: 2024-08-29 | Stop reason: HOSPADM

## 2024-08-27 RX ADMIN — CLINDAMYCIN PHOSPHATE 900 MG: 900 INJECTION, SOLUTION INTRAVENOUS at 13:54

## 2024-08-27 RX ADMIN — LIDOCAINE HYDROCHLORIDE AND EPINEPHRINE 3 ML: 15; 5 INJECTION, SOLUTION EPIDURAL at 18:04

## 2024-08-27 RX ADMIN — SODIUM CHLORIDE, SODIUM LACTATE, POTASSIUM CHLORIDE, AND CALCIUM CHLORIDE 125 ML/HR: .6; .31; .03; .02 INJECTION, SOLUTION INTRAVENOUS at 13:03

## 2024-08-27 RX ADMIN — SODIUM CHLORIDE, SODIUM LACTATE, POTASSIUM CHLORIDE, AND CALCIUM CHLORIDE 125 ML/HR: .6; .31; .03; .02 INJECTION, SOLUTION INTRAVENOUS at 15:10

## 2024-08-27 RX ADMIN — ROPIVACAINE HYDROCHLORIDE: 2 INJECTION, SOLUTION EPIDURAL; INFILTRATION at 18:05

## 2024-08-27 RX ADMIN — OXYTOCIN 2 MILLI-UNITS/MIN: 10 INJECTION INTRAVENOUS at 14:47

## 2024-08-27 RX ADMIN — CLINDAMYCIN PHOSPHATE 900 MG: 900 INJECTION, SOLUTION INTRAVENOUS at 20:30

## 2024-08-27 RX ADMIN — ONDANSETRON 4 MG: 2 INJECTION INTRAMUSCULAR; INTRAVENOUS at 16:21

## 2024-08-27 NOTE — ASSESSMENT & PLAN NOTE
40 weeks gestation of pregnancy  Contractions every 5-7 mins    PLAN  - Admit to L&D  for labor   -CBC, RPR, Type and screen  -Clear liquid diet  -SVE: 4/90/-2  -GBS status: positive; IV Clindamycin 900 mg  -Postpartum contraception plan: discuss  -Analgesia at maternal request  -Expectant management, likely augmentation with pitocin

## 2024-08-27 NOTE — ANESTHESIA PROCEDURE NOTES
Epidural Block    Patient location during procedure: OB/L&D  Start time: 8/27/2024 6:03 PM  Reason for block: procedure for pain  Staffing  Performed by: Martin Kline CRNA  Authorized by: Martin Goldberg MD    Preanesthetic Checklist  Completed: patient identified, IV checked, site marked, risks and benefits discussed, surgical consent, monitors and equipment checked, pre-op evaluation and timeout performed  Epidural  Patient position: sitting  Prep: ChloraPrep  Sedation Level: no sedation  Patient monitoring: frequent blood pressure checks, continuous pulse oximetry and heart rate  Approach: midline  Location: lumbar, L2-3  Injection technique: YUSUF air  Needle  Needle type: Tuohy   Needle gauge: 17 G  Needle insertion depth: 8 cm  Catheter type: multi-orifice  Catheter size: 19 G  Catheter at skin depth: 13 cm  Catheter securement method: stabilization device and clear occlusive dressing  Test dose: negative  Assessment  Sensory level: T10  Number of attempts: 1negative aspiration for CSF, negative aspiration for heme and no paresthesia on injection  patient tolerated the procedure well with no immediate complications

## 2024-08-27 NOTE — TELEPHONE ENCOUNTER
"40 weeks 3 days, EDC 8/24/24   Came home from L&D yesterday @ 7:30 AM, since yesterday has noticed yellow tinted discharge on min pad, patient states she is not sure if this is amniotic fluid or not.  Contractions 6-7 minutes apart all night, lasting few seconds and longer ( not timing) and contractions are mild to moderate severity. Patient states she is exhausted and up all night. Baby has been moving well. No bleeding.  Denies fever but feels hot and sweaty.  She states she doesn't want to be induced but concerned about bag of fluid may be ruptured. Advised to go to triage for evaluation.     [facility] Dr Brunson  [facility] L&D Charge     Reason for Disposition   Leakage of fluid from vagina    Additional Information   Leakage of fluid (trickle, gush) from the vagina    Answer Assessment - Initial Assessment Questions  1. DISCHARGE: \"Describe the discharge.\" (e.g., white, yellow, green, gray, foamy, cottage cheese-like)      Using mini pad has yellow fluid discharge that is not urine  2. ODOR: \"Is there a bad odor?\"      No odor  3. ONSET: \"When did the discharge begin?\"      Vaginal discharge started yesterday about   4. RASH: \"Is there a rash in that area?\" If Yes, ask: \"Describe it.\" (e.g., redness, blisters, sores, bumps)      denies  5. ABDOMINAL PAIN: \"Are you having any abdominal pain?\" If Yes, ask: \"What does it feel like?\" (e.g., crampy, dull, intermittent, constant)       Contractions are mild to moderate, severity changes  6. ABDOMINAL PAIN SEVERITY: If present, ask: \"How bad is it?\"  (e.g., Scale 1-10; mild, moderate, or severe)    - MILD (1-3): doesn't interfere with normal activities, abdomen soft and not tender to touch     - MODERATE (4-7): interferes with normal activities or awakens from sleep, tender to touch     - SEVERE (8-10): excruciating pain, doubled over, unable to do any normal activities      6-7/10 at times  7. CAUSE: \"What do you think is causing the discharge?\"      unsure  8. OTHER SYMPTOMS: \"Do " "you have any other symptoms?\" (e.g., fever, itching, vaginal bleeding, pain with urination)      Feels bladder urgency   9. SANDRA: \"What date are you expecting to deliver?\"       8/24/24  10. PREGNANCY: \"How many weeks pregnant are you?\"        40 weeks 3 days    Protocols used: Pregnancy - Vaginal Discharge-ADULT-[place], Pregnancy - Rupture of Membranes-ADULT-[place]    "

## 2024-08-27 NOTE — H&P
H & P- Obstetrics   Kelly Gabriel 28 y.o. female MRN: [id number]  Unit/Bed#:  Encounter: [id number]      Assessment/Plan:    Kelly is a 28 y.o.  at 40w3d admitted for labor    SVE: Cervical Dilation: 4  Cervical Effacement: 90  Cervical Consistency: Soft  Fetal Station: -2  OB Examiner: CHARISSA    Positive GBS test  Assessment & Plan  Clindamycin due to PCN allergy    * Uterine contractions  Assessment & Plan  40 weeks gestation of pregnancy  Contractions every 5-7 mins    PLAN  - Admit to L&D  for labor   -CBC, RPR, Type and screen  -Clear liquid diet  -SVE: /-2  -GBS status: positive; IV Clindamycin 900 mg  -Postpartum contraception plan: discuss  -Analgesia at maternal request  -Expectant management, likely augmentation with pitocin           Patient of: [facility]  This patient will be an INPATIENT  and I certify the anticipated length of stay is >2 Midnights  Discussed with Dr. Brunson      SUBJECTIVE:    Chief Complaint: contractions,vaginal discharge    HPI: Kelly Gabriel is a 28 y.o.  with an SANDRA of 2024, by Ultrasound at 40w3d who is being admitted for labor with likely SROM meconium. She complains of uterine contractions, occurring every 5-7 minutes, has light LOF that is yellow tinged, and reports no VB. FM present. This pregnancy is complicated by positive GBS, elevated BMI. All other review of systems is negative.       Pregnancy Plan:  Pregnancy: [place]  Support person: Howard Gabriel     Delivery Plans  Planned delivery location: [facility] L&D  Acceptable blood products: All     Post-Delivery Plans  Feeding intentions: Breast Milk      Patient Active Problem List   Diagnosis    Obesity, Class III, BMI 40-49.9 (morbid obesity) (HCC)    Fatigue    Gastroesophageal reflux disease without esophagitis    Irritable bowel syndrome    Multiple benign nevi without atypia    40 weeks gestation of pregnancy    Severe obesity due to excess calories affecting pregnancy in third  trimester (HCC)    Obesity affecting pregnancy, antepartum, unspecified obesity type    Cervical polyp    Positive GBS test    Uterine contractions       OB History    Para Term  AB Living   2 0 0 0 1 0   SAB IAB Ectopic Multiple Live Births   1              # Outcome Date GA Lbr Garth/2nd Weight Sex Type Anes PTL Lv   2 Current            1 2019 4w0d              Past Medical History:   Diagnosis Date    Abnormal Pap smear of cervix     ADHD     Anxiety     Binge eating disorder 2021    Depression     Elbow fracture     Miscarriage 2018    Morbid obesity with BMI of 45.0-49.9, adult (HCC)     Torn ACL        Past Surgical History:   Procedure Laterality Date    ELBOW SURGERY Left     UPPER GASTROINTESTINAL ENDOSCOPY      found hernia        Social History     Tobacco Use    Smoking status: Former     Current packs/day: 0.00     Average packs/day: 0.3 packs/day for 10.0 years (2.5 ttl pk-yrs)     Types: Cigarettes     Start date: 3/22/2009     Quit date: 3/22/2019     Years since quittin.4    Smokeless tobacco: Never   Substance Use Topics    Alcohol use: Not Currently     Comment: socially- very rarely.       Allergies   Allergen Reactions    Penicillins Other (See Comments)     Per pt states was told this when younger           Medications Prior to Admission:     Prenatal MV-Min-Fe Fum-FA-DHA (Prenatal Multivitamin Plus DHA) 27-0.8-250 MG CAPS    CALCIUM-MAG-VIT C-VIT D PO    ipratropium (ATROVENT) 0.03 % nasal spray    Lancets (onetouch ultrasoft) lancets        OBJECTIVE:  Vitals:  Temp:  [98.5 °F (36.9 °C)] 98.5 °F (36.9 °C)  HR:  [93] 93  Resp:  [18] 18  BP: (139)/(84) 139/84  There is no height or weight on file to calculate BMI.     Physical Exam:  General: Well appearing, no distress  Respiratory: Unlabored breathing  Cardiovascular: Regular rate.  Abdomen: Soft, gravid, nontender  Fundal Height: Appropriate for gestational age.  Extremities: Warm and well perfused.  Non  "tender.  Psychiatric: Behavioral normal    Speculum exam:  Normal external genitalia, pooling of yellow thin fluid, nitrazine positive, ferning negative      FHT:  Baseline Rate (FHR): 145 bpm  Variability: Moderate  Decelerations: (!) Late    TOCO:   Contraction Frequency (minutes): 5-7  Contraction Duration (seconds): 60-90  Contraction Intensity: Mild      Prenatal Labs:   Blood Type:   Lab Results   Component Value Date/Time    ABO Grouping O 02/15/2024 01:29 PM   D (Rh type):   Lab Results   Component Value Date/Time    Rh Factor Positive 02/15/2024 01:29 PM     HCT/HGB:   Lab Results   Component Value Date/Time    Hematocrit 41.0 08/27/2024 12:12 PM    Hemoglobin 13.9 08/27/2024 12:12 PM    Platelets:   Lab Results   Component Value Date/Time    Platelets 279 08/27/2024 12:12 PM   3 hour GTT: none   Rubella:   Lab Results   Component Value Date/Time    Rubella IgG Quant 33.2 02/15/2024 01:29 PM   VDRL/RPR: Non reactive  Hep B:   Lab Results   Component Value Date/Time    Hepatitis B Surface Ag Non-reactive 02/15/2024 01:29 PM   HIV: Non reactive  Hep C: Non reactive  Group B Strep:    Lab Results   Component Value Date/Time    Strep Grp B PCR Positive (A) 07/30/2024 10:20 AM    Strep Grp B PCR Positive for Beta Hemolytic Strep Grp B by PCR (A) 07/30/2024 10:20 AM    Strep Grp B PCR Streptococcus agalactiae (Group B) (A) 07/30/2024 10:20 AM            Thi Kim MD  PGY-1, Family Medicine Residency [place]  8/27/2024  12:51 PM    Thi Kim MD  PGY 2, Obstetrics and Gynecology  8/27/2024  12:51 PM      Portions of the record may have been created with voice recognition software.  Occasional wrong word or \"sound a like\" substitutions may have occurred due to the inherent limitations of voice recognition software.  Read the chart carefully and recognize, using context, where substitutions have occurred    "

## 2024-08-27 NOTE — PLAN OF CARE
Problem: PAIN - ADULT  Goal: Verbalizes/displays adequate comfort level or baseline comfort level  Description: Interventions:  - Encourage patient to monitor pain and request assistance  - Assess pain using appropriate pain scale  - Administer analgesics based on type and severity of pain and evaluate response  - Implement non-pharmacological measures as appropriate and evaluate response  - Consider cultural and social influences on pain and pain management  - Notify physician/advanced practitioner if interventions unsuccessful or patient reports new pain  Outcome: Progressing     Problem: INFECTION - ADULT  Goal: Absence or prevention of progression during hospitalization  Description: INTERVENTIONS:  - Assess and monitor for signs and symptoms of infection  - Monitor lab/diagnostic results  - Monitor all insertion sites, i.e. indwelling lines, tubes, and drains  - Monitor endotracheal if appropriate and nasal secretions for changes in amount and color  - [place] appropriate cooling/warming therapies per order  - Administer medications as ordered  - Instruct and encourage patient and family to use good hand hygiene technique  - Identify and instruct in appropriate isolation precautions for identified infection/condition  Outcome: Progressing  Goal: Absence of fever/infection during neutropenic period  Description: INTERVENTIONS:  - Monitor WBC    Outcome: Progressing     Problem: SAFETY ADULT  Goal: Patient will remain free of falls  Description: INTERVENTIONS:  - Educate patient/family on patient safety including physical limitations  - Instruct patient to call for assistance with activity   - Consult OT/PT to assist with strengthening/mobility   - Keep Call bell within reach  - Keep bed low and locked with side rails adjusted as appropriate  - Keep care items and personal belongings within reach  - Initiate and maintain comfort rounds  - Make Fall Risk Sign visible to staff  - Apply yellow socks and bracelet  for high fall risk patients  - Consider moving patient to room near nurses station  Outcome: Progressing  Goal: Maintain or return to baseline ADL function  Description: INTERVENTIONS:  -  Assess patient's ability to carry out ADLs; assess patient's baseline for ADL function and identify physical deficits which impact ability to perform ADLs (bathing, care of mouth/teeth, toileting, grooming, dressing, etc.)  - Assess/evaluate cause of self-care deficits   - Assess range of motion  - Assess patient's mobility; develop plan if impaired  - Assess patient's need for assistive devices and provide as appropriate  - Encourage maximum independence but intervene and supervise when necessary  - Involve family in performance of ADLs  - Assess for home care needs following discharge   - Consider OT consult to assist with ADL evaluation and planning for discharge  - Provide patient education as appropriate  Outcome: Progressing  Goal: Maintains/Returns to pre admission functional level  Description: INTERVENTIONS:  - Perform AM-PAC 6 Click Basic Mobility/ Daily Activity assessment daily.  - Set and communicate daily mobility goal to care team and patient/family/caregiver.   - Collaborate with rehabilitation services on mobility goals if consulted  - Out of bed for toileting  - Record patient progress and toleration of activity level   Outcome: Progressing     Problem: DISCHARGE PLANNING  Goal: Discharge to home or other facility with appropriate resources  Description: INTERVENTIONS:  - Identify barriers to discharge w/patient and caregiver  - Arrange for needed discharge resources and transportation as appropriate  - Identify discharge learning needs (meds, wound care, etc.)  - Arrange for interpretive services to assist at discharge as needed  - Refer to Case Management Department for coordinating discharge planning if the patient needs post-hospital services based on physician/advanced practitioner order or complex needs  related to functional status, cognitive ability, or social support system  Outcome: Progressing     Problem: Knowledge Deficit  Goal: Patient/family/caregiver demonstrates understanding of disease process, treatment plan, medications, and discharge instructions  Description: Complete learning assessment and assess knowledge base.  Interventions:  - Provide teaching at level of understanding  - Provide teaching via preferred learning methods  Outcome: Progressing  Goal: Verbalizes understanding of labor plan  Description: Assess patient/family/caregiver's baseline knowledge level and ability to understand information.  Provide education via patient/family/caregiver's preferred learning method at appropriate level of understanding.     1. Provide teaching at level of understanding.  2. Provide teaching via preferred learning method(s).  Outcome: Progressing     Problem: ANTEPARTUM  Goal: Maintain pregnancy as long as maternal and/or fetal condition is stable  Description: INTERVENTIONS:  - Maternal surveillance  - Fetal surveillance  - Monitor uterine activity  - Medications as ordered  - Bedrest  Outcome: Progressing     Problem: BIRTH - VAGINAL/ SECTION  Goal: Fetal and maternal status remain reassuring during the birth process  Description: INTERVENTIONS:  - Monitor vital signs  - Monitor fetal heart rate  - Monitor uterine activity  - Monitor labor progression (vaginal delivery)  - DVT prophylaxis  - Antibiotic prophylaxis  Outcome: Progressing  Goal: Emotionally satisfying birthing experience for mother/fetus  Description: Interventions:  - Assess, plan, implement and evaluate the nursing care given to the patient in labor  - Advocate the philosophy that each childbirth experience is a unique experience and support the family's chosen level of involvement and control during the labor process   - Actively participate in both the patient's and family's teaching of the birth process  - Consider cultural,  Mormon and age-specific factors and plan care for the patient in labor  Outcome: Progressing     Problem: POSTPARTUM  Goal: Experiences normal postpartum course  Description: INTERVENTIONS:  - Monitor maternal vital signs  - Assess uterine involution and lochia  Outcome: Progressing  Goal: Appropriate maternal -  bonding  Description: INTERVENTIONS:  - Identify family support  - Assess for appropriate maternal/infant bonding   -Encourage maternal/infant bonding opportunities  - Referral to  or  as needed  Outcome: Progressing  Goal: Establishment of infant feeding pattern  Description: INTERVENTIONS:  - Assess breast/bottle feeding  - Refer to lactation as needed  Outcome: Progressing  Goal: Incision(s), wounds(s) or drain site(s) healing without S/S of infection  Description: INTERVENTIONS  - Assess and document dressing, incision, wound bed, drain sites and surrounding tissue  - Provide patient and family education    Outcome: Progressing     Problem: Nutrition/Hydration-ADULT  Goal: Nutrient/Hydration intake appropriate for improving, restoring or maintaining nutritional needs  Description: Monitor and assess patient's nutrition/hydration status for malnutrition. Collaborate with interdisciplinary team and initiate plan and interventions as ordered.  Monitor patient's weight and dietary intake as ordered or per policy. Utilize nutrition screening tool and intervene as necessary. Determine patient's food preferences and provide high-protein, high-caloric foods as appropriate.     INTERVENTIONS:  - Monitor oral intake, urinary output, labs, and treatment plans  - Assess nutrition and hydration status and recommend course of action  - Evaluate amount of meals eaten  - Assist patient with eating if necessary   - Allow adequate time for meals  - Recommend/ encourage appropriate diets, oral nutritional supplements, and vitamin/mineral supplements  - Order, calculate, and assess  calorie counts as needed  - Recommend, monitor, and adjust tube feedings and TPN/PPN based on assessed needs  - Assess need for intravenous fluids  - Provide specific nutrition/hydration education as appropriate  - Include patient/family/caregiver in decisions related to nutrition  Outcome: Progressing     Problem: Labor & Delivery  Goal: Manages discomfort  Description: Assess and monitor for signs and symptoms of discomfort.  Assess patient's pain level regularly and per hospital policy.  Administer medications as ordered. Support use of nonpharmacological methods to help control pain such as distraction, imagery, relaxation, and application of heat and cold.  Collaborate with interdisciplinary team and patient to determine appropriate pain management plan.    1. Include patient in decisions related to comfort.  2. Offer non-pharmacological pain management interventions.  3. Report ineffective pain management to physician.  Outcome: Progressing  Goal: Patient vital signs are stable  Description: 1. Assess vital signs - vaginal delivery.  Outcome: Progressing     Problem: ALTERATION IN THE BREAST  Goal: Optimize infant feeding at the breast  Description: INTERVENTIONS:  - Latch, breast and nipple assessment  - Assess prior breast feeding history  - Hand expression of breast milk  - For cracked, bleeding and or sore nipples reassess latch, treat damaged nipple  -Educate mother on feeding cues  -Positioning/latch techniques  Outcome: Progressing     Problem: INADEQUATE LATCH, SUCK OR SWALLOW  Goal: Demonstrate ability to latch and sustain latch, audible swallowing and satiety  Description: INTERVENTIONS:  - Assess oral anatomy, notify Physician/AP for abnormal findings  - Establish milk expression  - Maximize feeding opportunity (skin to skin, behavioral state)  - Position/latch techniques  - Discourage use of pacifier-artificial nipple  - Mechanical pumping  - Nipple Shield  - Supplemental formula feeding  (Physician/AP order)  - Alternative feeding method  Outcome: Progressing

## 2024-08-27 NOTE — OB LABOR/OXYTOCIN SAFETY PROGRESS
Oxytocin Safety Progress Check Note - Kelly Gabriel 28 y.o. female MRN: [id number]    Unit/Bed#: -01 Encounter: [id number]    Dose (thu-units/min) Oxytocin: 4 thu-units/min  Contraction Frequency (minutes): 2-3  Contraction Intensity: Moderate  Uterine Activity Characteristics: Irregular  Cervical Dilation: 5-6        Cervical Effacement: 90  Fetal Station: -1  Baseline Rate (FHR): 150 bpm  Fetal Heart Rate (FHT): 170 BPM  FHR Category: 1               Vital Signs:   Vitals:    08/27/24 1730   BP: 138/90   Pulse: 86   Resp:    Temp:    SpO2:        Notes/comments:   Continuing to make cervical change, would like to get an epidural, pit at 4 with a cat 1 FHT, will recheck in 2-4 hours once comfortable      Mercedes Leung MD 8/27/2024 5:44 PM

## 2024-08-27 NOTE — ASSESSMENT & PLAN NOTE
Regular contractions every 6 mins    PLAN  - Admit to L&d  -CBC, RPR, Type and screen  - SVE: 4/90/-2  -FHT:   -GBS status: positive  -Postpartum contraception plan **  -Analgesia at maternal request  -Expectant management

## 2024-08-27 NOTE — ANESTHESIA PREPROCEDURE EVALUATION
Procedure:  LABOR ANALGESIA    Relevant Problems   ANESTHESIA (within normal limits)      CARDIO (within normal limits)      ENDO (within normal limits)      GI/HEPATIC   (+) Gastroesophageal reflux disease without esophagitis      /RENAL (within normal limits)      GYN   (+) 40 weeks gestation of pregnancy      HEMATOLOGY (within normal limits)      MUSCULOSKELETAL (within normal limits)      NEURO/PSYCH (within normal limits)      PULMONARY (within normal limits)      Other   (+) Obesity, Class III, BMI 40-49.9 (morbid obesity) (HCC)        Physical Exam    Airway    Mallampati score: II         Dental       Cardiovascular  Cardiovascular exam normal    Pulmonary  Pulmonary exam normal     Other Findings  post-pubertal.      Anesthesia Plan  ASA Score- 2     Anesthesia Type- epidural with ASA Monitors.         Additional Monitors:     Airway Plan: ETT.           Plan Factors-Exercise tolerance (METS): <4 METS.    Chart reviewed. EKG reviewed.  Existing labs reviewed. Patient summary reviewed.    Patient is not a current smoker. Patient not instructed to abstain from smoking on day of procedure. Patient did not smoke on day of surgery.            Induction- intravenous.    Postoperative Plan-     Perioperative Resuscitation Plan - Level 1 - Full Code.       Informed Consent- Anesthetic plan and risks discussed with patient.        Lab Results   Component Value Date    WBC 13.43 (H) 08/27/2024    HGB 13.9 08/27/2024     08/27/2024     Lab Results   Component Value Date    SODIUM 135 11/08/2023    K 3.9 11/08/2023    BUN 15 11/08/2023    CREATININE 0.84 11/08/2023    EGFR 96 11/08/2023         Blood type O    Lab Results   Component Value Date    HGBA1C 5.4 06/14/2021

## 2024-08-27 NOTE — OB LABOR/OXYTOCIN SAFETY PROGRESS
Oxytocin Safety Progress Check Note - Kelly Gabriel 28 y.o. female MRN: [id number]    Unit/Bed#: -01 Encounter: [id number]    Dose (thu-units/min) Oxytocin: 4 thu-units/min  Contraction Frequency (minutes): 4-7  Contraction Intensity: Moderate  Uterine Activity Characteristics: Irregular  Cervical Dilation: 4        Cervical Effacement: 90  Fetal Station: -2  Baseline Rate (FHR): 145 bpm  Fetal Heart Rate (FHT): 150 BPM  FHR Category: I               Vital Signs:   Vitals:    08/27/24 1500   BP: 139/73   Pulse:    Resp: 18   Temp: 98.3 °F (36.8 °C)   SpO2:        Notes/comments:   Kelly is feeling increased pain with contractions, she declines SVE at this time. We discussed pain management options; she prefers to hold off at this time. FHR category I. Continue pitocin titration and maternal repositioning.    Thi Kim MD 8/27/2024 4:00 PM

## 2024-08-27 NOTE — OB LABOR/OXYTOCIN SAFETY PROGRESS
Oxytocin Safety Progress Check Note - Kelly Gabriel 28 y.o. female MRN: [id number]    Unit/Bed#: -01 Encounter: [id number]    Dose (thu-units/min) Oxytocin: 4 thu-units/min -> held  Contraction Frequency (minutes): 2-3  Contraction Intensity: Moderate  Uterine Activity Characteristics: Irregular  Cervical Dilation: 10  Dilation Complete Date: 08/27/24  Dilation Complete Time: 1920  Cervical Effacement: 100  Fetal Station: 1  Baseline Rate (FHR): 150 bpm  Fetal Heart Rate (FHT): 150 BPM  FHR Category: 2               Vital Signs:   Vitals:    08/27/24 1845   BP: 134/72   Pulse: 69   Resp:    Temp:    SpO2:        Notes/comments:   Patient comfortable with epidural, noted to have a prolonged deceleration with minal in the 100s, SVE now complete and +1 station, Pitocin held with IV fluids running, fetal heart rate has now returned to baseline, will start pushing momentarily    Mercedes Leung MD 8/27/2024 7:22 PM

## 2024-08-28 PROCEDURE — 99024 POSTOP FOLLOW-UP VISIT: CPT | Performed by: OBSTETRICS & GYNECOLOGY

## 2024-08-28 PROCEDURE — 88307 TISSUE EXAM BY PATHOLOGIST: CPT | Performed by: PATHOLOGY

## 2024-08-28 RX ORDER — DIPHENHYDRAMINE HCL 25 MG
25 TABLET ORAL EVERY 6 HOURS PRN
Status: DISCONTINUED | OUTPATIENT
Start: 2024-08-28 | End: 2024-08-29 | Stop reason: HOSPADM

## 2024-08-28 RX ORDER — ACETAMINOPHEN 325 MG/1
650 TABLET ORAL EVERY 4 HOURS PRN
Status: DISCONTINUED | OUTPATIENT
Start: 2024-08-28 | End: 2024-08-29 | Stop reason: HOSPADM

## 2024-08-28 RX ORDER — ONDANSETRON 2 MG/ML
4 INJECTION INTRAMUSCULAR; INTRAVENOUS EVERY 8 HOURS PRN
Status: DISCONTINUED | OUTPATIENT
Start: 2024-08-28 | End: 2024-08-29 | Stop reason: HOSPADM

## 2024-08-28 RX ORDER — DOCUSATE SODIUM 100 MG/1
100 CAPSULE, LIQUID FILLED ORAL 2 TIMES DAILY
Status: DISCONTINUED | OUTPATIENT
Start: 2024-08-28 | End: 2024-08-29 | Stop reason: HOSPADM

## 2024-08-28 RX ORDER — IBUPROFEN 600 MG/1
600 TABLET, FILM COATED ORAL EVERY 6 HOURS
Status: DISCONTINUED | OUTPATIENT
Start: 2024-08-28 | End: 2024-08-29 | Stop reason: HOSPADM

## 2024-08-28 RX ORDER — CALCIUM CARBONATE 500 MG/1
1000 TABLET, CHEWABLE ORAL DAILY PRN
Status: DISCONTINUED | OUTPATIENT
Start: 2024-08-28 | End: 2024-08-29 | Stop reason: HOSPADM

## 2024-08-28 RX ORDER — SIMETHICONE 80 MG
80 TABLET,CHEWABLE ORAL 4 TIMES DAILY PRN
Status: DISCONTINUED | OUTPATIENT
Start: 2024-08-28 | End: 2024-08-29 | Stop reason: HOSPADM

## 2024-08-28 RX ORDER — BENZOCAINE/MENTHOL 6 MG-10 MG
1 LOZENGE MUCOUS MEMBRANE DAILY PRN
Status: DISCONTINUED | OUTPATIENT
Start: 2024-08-28 | End: 2024-08-29 | Stop reason: HOSPADM

## 2024-08-28 RX ADMIN — IBUPROFEN 600 MG: 600 TABLET, FILM COATED ORAL at 12:17

## 2024-08-28 RX ADMIN — DOCUSATE SODIUM 100 MG: 100 CAPSULE, LIQUID FILLED ORAL at 18:12

## 2024-08-28 RX ADMIN — ACETAMINOPHEN 650 MG: 325 TABLET, FILM COATED ORAL at 16:00

## 2024-08-28 RX ADMIN — BENZOCAINE AND LEVOMENTHOL 1 APPLICATION: 200; 5 SPRAY TOPICAL at 00:32

## 2024-08-28 RX ADMIN — DOCUSATE SODIUM 100 MG: 100 CAPSULE, LIQUID FILLED ORAL at 09:19

## 2024-08-28 RX ADMIN — IBUPROFEN 600 MG: 600 TABLET, FILM COATED ORAL at 18:12

## 2024-08-28 RX ADMIN — IBUPROFEN 600 MG: 600 TABLET, FILM COATED ORAL at 00:32

## 2024-08-28 NOTE — PLAN OF CARE
Problem: PAIN - ADULT  Goal: Verbalizes/displays adequate comfort level or baseline comfort level  Description: Interventions:  - Encourage patient to monitor pain and request assistance  - Assess pain using appropriate pain scale  - Administer analgesics based on type and severity of pain and evaluate response  - Implement non-pharmacological measures as appropriate and evaluate response  - Consider cultural and social influences on pain and pain management  - Notify physician/advanced practitioner if interventions unsuccessful or patient reports new pain  Outcome: Progressing     Problem: INFECTION - ADULT  Goal: Absence or prevention of progression during hospitalization  Description: INTERVENTIONS:  - Assess and monitor for signs and symptoms of infection  - Monitor lab/diagnostic results  - Monitor all insertion sites, i.e. indwelling lines, tubes, and drains  - Monitor endotracheal if appropriate and nasal secretions for changes in amount and color  - [place] appropriate cooling/warming therapies per order  - Administer medications as ordered  - Instruct and encourage patient and family to use good hand hygiene technique  - Identify and instruct in appropriate isolation precautions for identified infection/condition  Outcome: Progressing  Goal: Absence of fever/infection during neutropenic period  Description: INTERVENTIONS:  - Monitor WBC    Outcome: Progressing     Problem: SAFETY ADULT  Goal: Patient will remain free of falls  Description: INTERVENTIONS:  - Educate patient/family on patient safety including physical limitations  - Instruct patient to call for assistance with activity   - Consult OT/PT to assist with strengthening/mobility   - Keep Call bell within reach  - Keep bed low and locked with side rails adjusted as appropriate  - Keep care items and personal belongings within reach  - Initiate and maintain comfort rounds  - Make Fall Risk Sign visible to staff  Outcome: Progressing  Goal:  Maintain or return to baseline ADL function  Description: INTERVENTIONS:  -  Assess patient's ability to carry out ADLs; assess patient's baseline for ADL function and identify physical deficits which impact ability to perform ADLs (bathing, care of mouth/teeth, toileting, grooming, dressing, etc.)  - Assess/evaluate cause of self-care deficits   - Assess range of motion  - Assess patient's mobility; develop plan if impaired  - Assess patient's need for assistive devices and provide as appropriate  - Encourage maximum independence but intervene and supervise when necessary  - Involve family in performance of ADLs  - Assess for home care needs following discharge   - Consider OT consult to assist with ADL evaluation and planning for discharge  - Provide patient education as appropriate  Outcome: Progressing  Goal: Maintains/Returns to pre admission functional level  Description: INTERVENTIONS:  - Perform AM-PAC 6 Click Basic Mobility/ Daily Activity assessment daily.  - Set and communicate daily mobility goal to care team and patient/family/caregiver  - Out of bed for toileting  - Record patient progress and toleration of activity level   Outcome: Progressing     Problem: DISCHARGE PLANNING  Goal: Discharge to home or other facility with appropriate resources  Description: INTERVENTIONS:  - Identify barriers to discharge w/patient and caregiver  - Arrange for needed discharge resources and transportation as appropriate  - Identify discharge learning needs (meds, wound care, etc.)  - Arrange for interpretive services to assist at discharge as needed  - Refer to Case Management Department for coordinating discharge planning if the patient needs post-hospital services based on physician/advanced practitioner order or complex needs related to functional status, cognitive ability, or social support system  Outcome: Progressing     Problem: Knowledge Deficit  Goal: Patient/family/caregiver demonstrates understanding of  disease process, treatment plan, medications, and discharge instructions  Description: Complete learning assessment and assess knowledge base.  Interventions:  - Provide teaching at level of understanding  - Provide teaching via preferred learning methods  Outcome: Progressing  Goal: Verbalizes understanding of labor plan  Description: Assess patient/family/caregiver's baseline knowledge level and ability to understand information.  Provide education via patient/family/caregiver's preferred learning method at appropriate level of understanding.     1. Provide teaching at level of understanding.  2. Provide teaching via preferred learning method(s).  Outcome: Progressing     Problem: POSTPARTUM  Goal: Experiences normal postpartum course  Description: INTERVENTIONS:  - Monitor maternal vital signs  - Assess uterine involution and lochia  Outcome: Progressing  Goal: Appropriate maternal -  bonding  Description: INTERVENTIONS:  - Identify family support  - Assess for appropriate maternal/infant bonding   -Encourage maternal/infant bonding opportunities  - Referral to  or  as needed  Outcome: Progressing  Goal: Establishment of infant feeding pattern  Description: INTERVENTIONS:  - Assess breast/bottle feeding  - Refer to lactation as needed  Outcome: Progressing  Goal: Incision(s), wounds(s) or drain site(s) healing without S/S of infection  Description: INTERVENTIONS  - Assess and document dressing, incision, wound bed, drain sites and surrounding tissue  - Provide patient and family education  Outcome: Progressing     Problem: Nutrition/Hydration-ADULT  Goal: Nutrient/Hydration intake appropriate for improving, restoring or maintaining nutritional needs  Description: Monitor and assess patient's nutrition/hydration status for malnutrition. Collaborate with interdisciplinary team and initiate plan and interventions as ordered.  Monitor patient's weight and dietary intake as ordered or  per policy. Utilize nutrition screening tool and intervene as necessary. Determine patient's food preferences and provide high-protein, high-caloric foods as appropriate.     INTERVENTIONS:  - Monitor oral intake, urinary output, labs, and treatment plans  - Assess nutrition and hydration status and recommend course of action  - Evaluate amount of meals eaten  - Assist patient with eating if necessary   - Allow adequate time for meals  - Recommend/ encourage appropriate diets, oral nutritional supplements, and vitamin/mineral supplements  - Order, calculate, and assess calorie counts as needed  - Recommend, monitor, and adjust tube feedings and TPN/PPN based on assessed needs  - Assess need for intravenous fluids  - Provide specific nutrition/hydration education as appropriate  - Include patient/family/caregiver in decisions related to nutrition  Outcome: Progressing     Problem: ALTERATION IN THE BREAST  Goal: Optimize infant feeding at the breast  Description: INTERVENTIONS:  - Latch, breast and nipple assessment  - Assess prior breast feeding history  - Hand expression of breast milk  - For cracked, bleeding and or sore nipples reassess latch, treat damaged nipple  -Educate mother on feeding cues  -Positioning/latch techniques  Outcome: Progressing     Problem: INADEQUATE LATCH, SUCK OR SWALLOW  Goal: Demonstrate ability to latch and sustain latch, audible swallowing and satiety  Description: INTERVENTIONS:  - Assess oral anatomy, notify Physician/AP for abnormal findings  - Establish milk expression  - Maximize feeding opportunity (skin to skin, behavioral state)  - Position/latch techniques  - Discourage use of pacifier-artificial nipple  - Mechanical pumping  - Nipple Shield  - Supplemental formula feeding (Physician/AP order)  - Alternative feeding method  Outcome: Progressing

## 2024-08-28 NOTE — PROGRESS NOTES
"Progress Note - OB/GYN  Kelly Gabriel 28 y.o. female MRN: [id number]  Unit/Bed#: -01 Encounter: [id number]    Assessment and Plan     Kelly Gabriel is a patient of: [facility]. She is PPD# 1 s/p  spontaneous vaginal delivery  Recovering well and is stable       *  (spontaneous vaginal delivery)  Assessment & Plan    Recovering well   Routine postpartum care, encourage ambulation, encourage familial bonding  Lactation support for breast feeding as needed  Tolerating regular diet  Pain: Motrin/Tylenol around the clock  Appropriate bowel and bladder function   Postpartum Contraceptive plan: undecided  DVT Ppx: Ambulation    Preeclampsia without severe features  Assessment & Plan  CBC/CMP wnl, UPC 0.7    Systolic (12hrs), Av , Min:110 , Max:167   Diastolic (12hrs), Av, Min:58, Max:88        Positive GBS test  Assessment & Plan  Received Clindamycin due to PCN allergy        Disposition    - Anticipate discharge home on PPD# 1-2  Barrier to discharge: none    Subjective/Objective     Chief Complaint: Postpartum State     Subjective:    Kelly Gabriel is PPD#1 s/p  spontaneous vaginal delivery. She has no current complaints.  Pain is well controlled.  Patient is currently voiding.  She is ambulating.  Patient is currently passing flatus and has had bowel movement. She is tolerating PO, and denies nausea or vomitting. Patient denies fever, chills, chest pain, shortness of breath, or calf tenderness. Lochia is normal. She is  Breastfeeding. She is recovering well and is stable.       Vitals:   /66 (BP Location: Right arm)   Pulse 96   Temp 97.9 °F (36.6 °C) (Oral)   Resp 16   Ht 5' 3\" (1.6 m)   Wt 117 kg (258 lb)   LMP 2023 (Exact Date)   SpO2 97%   Breastfeeding Yes   BMI 45.70 kg/m²       Intake/Output Summary (Last 24 hours) at 2024 0643  Last data filed at 2024 0345  Gross per 24 hour   Intake 2560 ml   Output 691 ml   Net 1869 ml       Invasive Devices       " Peripheral Intravenous Line  Duration             Peripheral IV 08/27/24 Right;Ventral (anterior) Hand <1 day              Epidural Line  Duration             Epidural Catheter 08/27/24 <1 day                    Physical Exam:   GEN: Kelly Gabriel appears well, alert and oriented x 3, pleasant and cooperative   CARDIO: RRR, no murmurs or rubs  RESP:  CTAB, no wheezes or rales  ABDOMEN: soft, no tenderness, no distention, fundus @ below umbilicus  EXTREMITIES:  non tender, no erythema, no oedema      Labs:     Hemoglobin   Date Value Ref Range Status   08/27/2024 13.9 11.5 - 15.4 g/dL Final   05/09/2024 12.6 11.5 - 15.4 g/dL Final     WBC   Date Value Ref Range Status   08/27/2024 13.43 (H) 4.31 - 10.16 Thousand/uL Final   05/09/2024 12.30 (H) 4.31 - 10.16 Thousand/uL Final     Platelets   Date Value Ref Range Status   08/27/2024 279 149 - 390 Thousands/uL Final   05/09/2024 343 149 - 390 Thousands/uL Final     Creatinine   Date Value Ref Range Status   08/27/2024 0.74 0.60 - 1.30 mg/dL Final     Comment:     Standardized to IDMS reference method   11/08/2023 0.84 0.40 - 1.10 mg/dL Final   03/16/2023 0.82 0.60 - 1.30 mg/dL Final     Comment:     Standardized to IDMS reference method     AST   Date Value Ref Range Status   08/27/2024 18 13 - 39 U/L Final   11/08/2023 21 <41 U/L Final   03/16/2023 17 5 - 45 U/L Final     Comment:     Specimen collection should occur prior to Sulfasalazine administration due to the potential for falsely depressed results.      ALT   Date Value Ref Range Status   08/27/2024 12 7 - 52 U/L Final     Comment:     Specimen collection should occur prior to Sulfasalazine administration due to the potential for falsely depressed results.    11/08/2023 16 <56 U/L Final   03/16/2023 20 12 - 78 U/L Final     Comment:     Specimen collection should occur prior to Sulfasalazine and/or Sulfapyridine administration due to the potential for falsely depressed results.           Sweta Craven  Blaze  PGY-1, Family Medicine Residency [place]  8/28/2024  6:43 AM

## 2024-08-28 NOTE — ASSESSMENT & PLAN NOTE
CBC/CMP wnl, UPC 0.7    Systolic (12hrs), Av , Min:121 , Max:139   Diastolic (12hrs), Av, Min:65, Max:68

## 2024-08-28 NOTE — L&D DELIVERY NOTE
DELIVERY NOTE  Kelly Gabriel 28 y.o. female MRN: [id number]  Unit/Bed#:  202-01 Encounter: [id number]    Obstetrician: Sheyla Stark MD    Assistant: Don Leung, PGY3    Pre-Delivery Diagnosis:   [place] intrauterine pregnancy at 40 weeks  Term pregnancy or Preeclampsia    Post-Delivery Diagnosis:   Same as above, delivered    Procedure: Spontaneous vaginal delivery    Quantitative Blood Loss: 216 mL           Complications:  None    Brief labor course: Kelly Gabriel is a 28 year old  who was admitted for rupture of membranes. Labor was augmented with pitocin. She  received an epidural for pain control. She progressed to complete cervical dilation at 192 and began pushing at . Warm compresses and perineal massage were used during the second stage of labor.     Description of Delivery: Patient pushed to deliver a viable Male  on 24 at 2309. A nuchal cord was not noted. With the assistance of maternal expulsive efforts and downward traction of fetal head, the anterior shoulder (fetal right) was delivered without difficulty, followed by the remainder of the infant's body. Delayed cord clamping was performed, after which the  was passed off to  staff for routine care. IV pitocin infusion was initiated. Umbilical cord blood and umbilical artery and venous gases were collected. Placenta was delivered with fundal massage and gentle traction on the cord with active management of the third stage of labor. Placenta delivered intact with a 3-vessel cord. An initial gush of blood was noted, which quickly resolved. The uterus was noted to be firm, palpated at U-2.    Inspection of the perineum, vagina, labia, and urethra revealed a small 1st degree laceration which was then repaired in running locked fashion with 3-0 vicryl rapide, with excellent hemostasis noted.    Mother and baby are currently recovering nicely in stable condition.    APGARS: 9 at one minute, 9 at 5 minutes    Blood  gases: Arterial pH: 7.205, Base excess: -6.8; Venous pH: 7.313, Base excess: -5.8

## 2024-08-28 NOTE — LACTATION NOTE
This note was copied from a baby's chart.  CONSULT - LACTATION  Baby Boy Gabriel (Brandi) 1 days male MRN: [id number]    [facility] NURSERY Room / Bed: (N)/(N) Encounter: [id number]    Maternal Information     MOTHER:  Kelly Gabriel  Maternal Age: 28 y.o.  OB History: # 1 - Date: , Sex: None, Weight: None, GA: 4w0d, Type: None, Apgar1: None, Apgar5: None, Living: None, Birth Comments: None    # 2 - Date: 24, Sex: Male, Weight: 3615 g (7 lb 15.5 oz), GA: 40w3d, Type: Vaginal, Spontaneous, Apgar1: 9, Apgar5: 9, Living: Living, Birth Comments: None   Previouse breast reduction surgery? No    Lactation history:   Has patient previously breast fed: No   How long had patient previously breast fed:     Previous breast feeding complications:       Past Surgical History:   Procedure Laterality Date    ELBOW SURGERY Left     UPPER GASTROINTESTINAL ENDOSCOPY      found hernia        Birth information:  YOB: 2026   Time of birth: 11:09 PM   Sex: male   Delivery type: Vaginal, Spontaneous   Birth Weight: 3615 g (7 lb 15.5 oz)   Percent of Weight Change: 0%     Gestational Age: 40w3d   [unfilled]    Assessment     Breast and nipple assessment: normal assessment     Assessment: normal assessment    Feeding assessment: feeding well  LATCH:  Latch: Grasps breast, tongue down, lips flanged, rhythmic sucking   Audible Swallowing: Spontaneous and intermittent (24 hours old)   Type of Nipple: Everted (After stimulation)   Comfort (Breast/Nipple): Soft/non-tender   Hold (Positioning): Partial assist, teach one side, mother does other, staff holds   LATCH Score: 9           24 1200   Lactation Consultation   Reason for Consult 20;20 min   Maternal Information   Has mother  before? No   Infant to breast within first hour of birth? Yes   Exclusive Pump and Bottle Feed No   LATCH Documentation   Latch 2   Audible Swallowing 2   Type of  Nipple 2   Comfort (Breast/Nipple) 2   Hold (Positioning) 1   LATCH Score 9   Having latch problems? No   Position(s) Used Football   Breasts/Nipples   Left Breast Soft   Right Breast Soft   Left Nipple Everted   Right Nipple Cracked;Everted   Intervention Hand expression;Lanolin   Breastfeeding Progress Not yet established   Breast Pump   Pump 3  ([employer] pump pamphlet given)   Patient Follow-Up   Lactation Consult Status 2   Follow-Up Type Inpatient;Call as needed   Other OB Lactation Documentation    Additional Problem Noted Nurse called out for help due to late feeding cue. Repositioned mom and baby in football hold on right breast. Demonstration with teach back of deep latch. Mom transitioned to left breast and latched. Reviewed hunger/fullness cues. Reviewed babies bellies and milk amounts. Enc to call for further assistance.  (RSB and DC booklets reviewed)     Feeding recommendations:  breast feed on demand    Information on hand expression given. Discussed benefits of knowing how to manually express breast including stimulating milk supply, softening nipple for latch and evacuating breast in the event of engorgement.    Provided demonstration, education and support of deep latch to breast by placing the nipple to the nose, dragging down to chin to achieve a wide latch. Bring baby to the breast, not breast to baby. Move your shoulders down and away from your ears. Look for ear, shoulder, hip alignment. Baby's upper and lower lip should be flanged on the breast.    Met with mother. Provided mother with Ready, Set, Baby booklet which contained information on:  Hand expression with access to QR codes to review hand expression.  Positioning and latch reviewed as well as showing images of other feeding positions.  Discussed the properties of a good latch in any position.   Feeding on cue and what that means for recognizing infant's hunger, s/s that baby is getting enough milk and some s/s that breastfeeding dyad may  need further help  Skin to Skin contact an benefits to mom and baby  Avoidance of pacifiers for the first month discussed.   Gave information on common concerns, what to expect the first few weeks after delivery, preparing for other caregivers, and how partners can help. Resources for support also provided.    Met with mother to go over discharge breastfeeding booklet including the feeding log. Emphasized 8 or more (12) feedings in a 24 hour period, what to expect for the number of diapers per day of life and the progression of properties of the  stooling pattern.    List of reasons to call a lactation consultant.  Feeding logs  Feeding cues  Hand expression  Baby's Second day (cluster feeding)  Breastfeeding and Your Lifestyle (Medications, Alcohol, Caffeine, Smoking, Street Drugs, Methadone)  First Two Weeks Survival Guide for Breastfeeding  Breast Changes  Physical Therapy  Storage and Handling of Breast milk  How to Keep Your Breast Pump Kit Clean  The Employed Breastfeeding Mother  Mixed feeding  Bottle feeding like breastfeeding (paced bottle feeding)  astfeeding and your lifestyle, storage and preparation of breast milk, how to keep you breast pump clean, the employed breastfeeding mother and paced bottle feeding handouts.     Booklet included Breastfeeding Resources for after discharge including access to the number for the [facility].    Shana Patel MA 2024 12:58 PM

## 2024-08-28 NOTE — ASSESSMENT & PLAN NOTE
Recovering well   Routine postpartum care, encourage ambulation, encourage familial bonding  Lactation support for breast feeding as needed  Tolerating regular diet  Pain: Motrin/Tylenol around the clock  Appropriate bowel and bladder function   Postpartum Contraceptive plan: undecided, wants to discuss outpatient  DVT Ppx: Ambulation

## 2024-08-28 NOTE — DISCHARGE SUMMARY
Discharge Summary - Kelly Gabriel 28 y.o. female MRN: [id number]    Unit/Bed#: -01 Encounter: [id number]    Admission Date: 2024     Discharge Date: 2024    Admitting Diagnosis:   Patient Active Problem List   Diagnosis    Obesity, Class III, BMI 40-49.9 (morbid obesity) (Formerly [facility])    Fatigue    Gastroesophageal reflux disease without esophagitis    Irritable bowel syndrome    Multiple benign nevi without atypia    40 weeks gestation of pregnancy    Severe obesity due to excess calories affecting pregnancy in third trimester (Formerly [facility])    Obesity affecting pregnancy, antepartum, unspecified obesity type    Cervical polyp    Positive GBS test     (spontaneous vaginal delivery)    Preeclampsia without severe features       Discharge Diagnosis:   Same, delivered    Procedures:   spontaneous vaginal delivery    Admitting Attending: Boy  Delivery Attending: Lincoln Blackman Attending: [facility] Course:     Kelly Gabriel is a 28 y.o.  who was admitted at 40w3d for SROM and NRFHT. A forebag was ruptured for thick mec and she was started on pitocin. She eventually received an epidural for pain control and progressed to fully dilated without complication.    She delivered a viable male  on 24 at 2309. Weight 7lbs 15.5oz via spontaneous vaginal delivery. Apgars were 9 (1 min) and 9 (5 min).  was transferred to  nursery. Patient tolerated the procedure well and was transferred to recovery in stable condition.     She met criteria for preeclampsia while in labor. Her CBC and CMP were normal, her urine protein:creatinine ratio was 0.6.     Her post-partum course was uncomplicated.  She did not require any antihypertensives. Her post-partum pain was well controlled with oral analgesics.      On day of discharge, she was ambulating and able to reasonably perform all ADLs.  She was voiding and had appropriate bowel function. Pain was well controlled. She was discharged home on  postpartum day #2 without complications. Patient was instructed to follow up with her OB as an outpatient and was given appropriate warnings to call doctor or provider if she develops signs of infection or uncontrolled pain.    On day of discharge she was ambulating, voiding spontaneously, tolerating oral intake and hemodynamically stable. Mom's blood type is O positive and  Rhogam was not given.    Disposition: Home    Planned Readmission: No    Discharge Medications:   Prenatal vitamin daily for 6 months or the duration of nursing, whichever is longer.  Motrin 600 mg orally every 6 hours as needed for pain  Tylenol (over the counter) per bottle directions as needed for pain  Hydrocortisone cream 1% (over the counter) applied 1-2x daily to perineum as needed  Witch hazel pads for perineal discomfort as needed      Discharge instructions :   -Do not place anything (no partner, tampons or douche) in your vagina for 6 weeks.  -You may walk for exercise for the first 6 weeks then gradually return to your usual activities.   -Please do not drive for 1 week if you have no stitches and for 2 weeks if you have stitches or underwent a  delivery.    -You may take baths or shower per your preference.   -Please look at your bust (breasts) in the mirror daily and call your doctor for redness or tenderness or increased warmth.   - If you have had a  section please look at your incision daily as well and call provider for increasing redness or steady drainage from the incision.   -Please call your doctor's office if temperature > 100.4*F or 38* C, worsening pain or a foul discharge.    Follow Up:  - Follow up in 3 weeks for postpartum visit  - Follow up in 1 week for blood pressure check.

## 2024-08-28 NOTE — OB LABOR/OXYTOCIN SAFETY PROGRESS
Oxytocin Safety Progress Check Note - Kelly Gabriel 28 y.o. female MRN: [id number]    Unit/Bed#: -01 Encounter: [id number]    Dose (thu-units/min) Oxytocin: 10 thu-units/min  Contraction Frequency (minutes): 1.5-3  Contraction Intensity: Moderate/Strong  Uterine Activity Characteristics: Regular  Cervical Dilation: 10  Dilation Complete Date: 08/27/24  Dilation Complete Time: 1920  Cervical Effacement: 100  Fetal Station: 3  Baseline Rate (FHR): 155 bpm  Fetal Heart Rate (FHT): 175 BPM  FHR Category: 1               Vital Signs:   Vitals:    08/27/24 2239   BP: 110/63   Pulse: 78   Resp:    Temp:    SpO2:        Notes/comments:   FHT Cat 1, pushing well. Will continue     Sheyla Stark MD 8/27/2024 10:41 PM

## 2024-08-28 NOTE — PLAN OF CARE
Problem: PAIN - ADULT  Goal: Verbalizes/displays adequate comfort level or baseline comfort level  Description: Interventions:  - Encourage patient to monitor pain and request assistance  - Assess pain using appropriate pain scale  - Administer analgesics based on type and severity of pain and evaluate response  - Implement non-pharmacological measures as appropriate and evaluate response  - Consider cultural and social influences on pain and pain management  - Notify physician/advanced practitioner if interventions unsuccessful or patient reports new pain  Outcome: Progressing     Problem: INFECTION - ADULT  Goal: Absence or prevention of progression during hospitalization  Description: INTERVENTIONS:  - Assess and monitor for signs and symptoms of infection  - Monitor lab/diagnostic results  - Monitor all insertion sites, i.e. indwelling lines, tubes, and drains  - Monitor endotracheal if appropriate and nasal secretions for changes in amount and color  - [place]audette appropriate cooling/warming therapies per order  - Administer medications as ordered  - Instruct and encourage patient and family to use good hand hygiene technique  - Identify and instruct in appropriate isolation precautions for identified infection/condition  Outcome: Progressing  Goal: Absence of fever/infection during neutropenic period  Description: INTERVENTIONS:  - Monitor WBC    Outcome: Progressing     Problem: SAFETY ADULT  Goal: Patient will remain free of falls  Description: INTERVENTIONS:  - Educate patient/family on patient safety including physical limitations  - Instruct patient to call for assistance with activity   - Consult OT/PT to assist with strengthening/mobility   - Keep Call bell within reach  - Keep bed low and locked with side rails adjusted as appropriate  - Keep care items and personal belongings within reach  - Initiate and maintain comfort rounds  - Make Fall Risk Sign visible to staff  - Offer Toileting every  Hours,  in advance of need  - Initiate/Maintain alarm  - Obtain necessary fall risk management equipment:   - Apply yellow socks and bracelet for high fall risk patients  - Consider moving patient to room near nurses station  Outcome: Progressing  Goal: Maintain or return to baseline ADL function  Description: INTERVENTIONS:  -  Assess patient's ability to carry out ADLs; assess patient's baseline for ADL function and identify physical deficits which impact ability to perform ADLs (bathing, care of mouth/teeth, toileting, grooming, dressing, etc.)  - Assess/evaluate cause of self-care deficits   - Assess range of motion  - Assess patient's mobility; develop plan if impaired  - Assess patient's need for assistive devices and provide as appropriate  - Encourage maximum independence but intervene and supervise when necessary  - Involve family in performance of ADLs  - Assess for home care needs following discharge   - Consider OT consult to assist with ADL evaluation and planning for discharge  - Provide patient education as appropriate  Outcome: Progressing  Goal: Maintains/Returns to pre admission functional level  Description: INTERVENTIONS:  - Perform AM-PAC 6 Click Basic Mobility/ Daily Activity assessment daily.  - Set and communicate daily mobility goal to care team and patient/family/caregiver.   - Collaborate with rehabilitation services on mobility goals if consulted  - Perform Range of Motion  times a day.  - Reposition patient every  hours.  - Dangle patient  times a day  - Stand patient  times a day  - Ambulate patient  times a day  - Out of bed to chair  times a day   - Out of bed for meals  times a day  - Out of bed for toileting  - Record patient progress and toleration of activity level   Outcome: Progressing     Problem: DISCHARGE PLANNING  Goal: Discharge to home or other facility with appropriate resources  Description: INTERVENTIONS:  - Identify barriers to discharge w/patient and caregiver  - Arrange for  needed discharge resources and transportation as appropriate  - Identify discharge learning needs (meds, wound care, etc.)  - Arrange for interpretive services to assist at discharge as needed  - Refer to Case Management Department for coordinating discharge planning if the patient needs post-hospital services based on physician/advanced practitioner order or complex needs related to functional status, cognitive ability, or social support system  Outcome: Progressing     Problem: Knowledge Deficit  Goal: Patient/family/caregiver demonstrates understanding of disease process, treatment plan, medications, and discharge instructions  Description: Complete learning assessment and assess knowledge base.  Interventions:  - Provide teaching at level of understanding  - Provide teaching via preferred learning methods  Outcome: Progressing  Goal: Verbalizes understanding of labor plan  Description: Assess patient/family/caregiver's baseline knowledge level and ability to understand information.  Provide education via patient/family/caregiver's preferred learning method at appropriate level of understanding.     1. Provide teaching at level of understanding.  2. Provide teaching via preferred learning method(s).  Outcome: Progressing     Problem: POSTPARTUM  Goal: Experiences normal postpartum course  Description: INTERVENTIONS:  - Monitor maternal vital signs  - Assess uterine involution and lochia  Outcome: Progressing  Goal: Appropriate maternal -  bonding  Description: INTERVENTIONS:  - Identify family support  - Assess for appropriate maternal/infant bonding   -Encourage maternal/infant bonding opportunities  - Referral to  or  as needed  Outcome: Progressing  Goal: Establishment of infant feeding pattern  Description: INTERVENTIONS:  - Assess breast/bottle feeding  - Refer to lactation as needed  Outcome: Progressing  Goal: Incision(s), wounds(s) or drain site(s) healing without S/S of  infection  Description: INTERVENTIONS  - Assess and document dressing, incision, wound bed, drain sites and surrounding tissue  - Provide patient and family education  - Perform skin care/dressing changes every  Outcome: Progressing     Problem: ANTEPARTUM  Goal: Maintain pregnancy as long as maternal and/or fetal condition is stable  Description: INTERVENTIONS:  - Maternal surveillance  - Fetal surveillance  - Monitor uterine activity  - Medications as ordered  - Bedrest  Outcome: Completed     Problem: BIRTH - VAGINAL/ SECTION  Goal: Fetal and maternal status remain reassuring during the birth process  Description: INTERVENTIONS:  - Monitor vital signs  - Monitor fetal heart rate  - Monitor uterine activity  - Monitor labor progression (vaginal delivery)  - DVT prophylaxis  - Antibiotic prophylaxis  Outcome: Completed  Goal: Emotionally satisfying birthing experience for mother/fetus  Description: Interventions:  - Assess, plan, implement and evaluate the nursing care given to the patient in labor  - Advocate the philosophy that each childbirth experience is a unique experience and support the family's chosen level of involvement and control during the labor process   - Actively participate in both the patient's and family's teaching of the birth process  - Consider cultural, Mu-ism and age-specific factors and plan care for the patient in labor  Outcome: Completed     Problem: Labor & Delivery  Goal: Manages discomfort  Description: Assess and monitor for signs and symptoms of discomfort.  Assess patient's pain level regularly and per hospital policy.  Administer medications as ordered. Support use of nonpharmacological methods to help control pain such as distraction, imagery, relaxation, and application of heat and cold.  Collaborate with interdisciplinary team and patient to determine appropriate pain management plan.    1. Include patient in decisions related to comfort.  2. Offer  non-pharmacological pain management interventions.  3. Report ineffective pain management to physician.  Outcome: Completed  Goal: Patient vital signs are stable  Description: 1. Assess vital signs - vaginal delivery.  Outcome: Completed

## 2024-08-28 NOTE — OB LABOR/OXYTOCIN SAFETY PROGRESS
Oxytocin Safety Progress Check Note - Kelly Gabriel 28 y.o. female MRN: [id number]    Unit/Bed#: -01 Encounter: [id number]    Dose (thu-units/min) Oxytocin: 6 thu-units/min  Contraction Frequency (minutes): 2  Contraction Intensity: Moderate/Strong  Uterine Activity Characteristics: Regular  Cervical Dilation: 10  Dilation Complete Date: 08/27/24  Dilation Complete Time: 1920  Cervical Effacement: 100  Fetal Station: 2  Baseline Rate (FHR): 160 bpm  Fetal Heart Rate (FHT): 155 BPM  FHR Category: 1               Vital Signs:   Vitals:    08/27/24 2125   BP: 141/67   Pulse: 75   Resp:    Temp:    SpO2:        Notes/comments:   Pushing well, progressing. Will continue to push    Sheyla Stark MD 8/27/2024 9:35 PM

## 2024-08-28 NOTE — LACTATION NOTE
This note was copied from a baby's chart.  CONSULT - LACTATION  Baby Boy Gabriel (Brandi) 1 days male MRN: [id number]    [facility] AN NURSERY Room / Bed: (N)/(N) Encounter: [id number]    Maternal Information     MOTHER:  Kelly Gabriel  Maternal Age: 28 y.o.  OB History: # 1 - Date: , Sex: None, Weight: None, GA: 4w0d, Type: None, Apgar1: None, Apgar5: None, Living: None, Birth Comments: None    # 2 - Date: 24, Sex: Male, Weight: 3615 g (7 lb 15.5 oz), GA: 40w3d, Type: Vaginal, Spontaneous, Apgar1: 9, Apgar5: 9, Living: Living, Birth Comments: None   Previouse breast reduction surgery? No    Lactation history:   Has patient previously breast fed: No   How long had patient previously breast fed:     Previous breast feeding complications:       Past Surgical History:   Procedure Laterality Date    ELBOW SURGERY Left     UPPER GASTROINTESTINAL ENDOSCOPY      found hernia        Birth information:  YOB: 2026   Time of birth: 11:09 PM   Sex: male   Delivery type: Vaginal, Spontaneous   Birth Weight: 3615 g (7 lb 15.5 oz)   Percent of Weight Change: 0%     Gestational Age: 40w3d   [unfilled]    Assessment     Breast and nipple assessment:  large breasts with darker areolas and everted, round nipples     Assessment:  U shape tongue when crying    Feeding assessment: latch difficulty (due to positioning)  LATCH:  Latch: Repeated attempts, hold nipple in mouth, stimulate to suck   Audible Swallowing: Spontaneous and intermittent (24 hours old)   Type of Nipple: Everted (After stimulation)   Comfort (Breast/Nipple): Soft/non-tender   Hold (Positioning): Partial assist, teach one side, mother does other, staff holds   LATCH Score: 8          Feeding recommendations:  breast feed on demand. Baby has a high-pitched scream. Baby is in bassinet, no hat, no blanket. Mom states parents just changed the diaper. Mom states she has not fed since the  last feeding with lactation.    Ed. On late stage feeding cues.     Ed. On offering both breasts at every feeding. Ed. On starting on the side the last feeding ended. Mom demonstrated hand expression. Enc. Mom to use small movements, nothing stronger than touching your eye. Verbal education moving the milk towards the nipple.     Demonstration and teach back of use of pillows to create a shelf for the breast and the baby.  Enc. To bring baby s2s. Demonstration and teach back of alignment. Demonstration and teach back of chin to breast, nipple to nose, wait for a large mouth and take baby to breast, not breast to baby.     Deep latch achieved with lactation.  Enc. Snug hold of baby and asymmetrical alignment.     Mom states she does not understand.  Demonstration of each step with lactation holding the baby up to the breast. Mom seems cautious and skeptical of positioning.     Baby has a deep latch with active, coordinated sucks. Demonstration and teach back of of breast compressions. Demonstration of alignment and how to create a deep latch.     Ed. On feeding on demand, s2s, breast compressions, and how to know when baby is satisfied.     Once baby unlatched, demonstration of burping techniques.     Brought baby to the right breast in football hold. Modifications provided in positioning. Mom has a better, snug hold on the baby and the breast.     Enc. S2s with FOB    mom wants pump from ins. Input order.      Enc. And reassurance provided    Enc. To call lactation.     Education of breastfeeding with large breasts. Demonstration and teach back of positioning and alignment. Use pillows, tables, rolled towels/blankets to lift breast. Lift baby up to breast level. Education on hand expression prior to latch, positioning of hand to compress the breast, and positioning and alignment of baby for deep latch with large breasts.     Mom's nipple everts with stimulation. With nipple compression, short shank noted. Education  on ways to elongate the nipple: Hand expression, Latch assist, breast shells, supple cups, manual and electric pump stimulation.     Education on s2s for feedings. Encouraged hand expression prior to latch. Education on baby's body alignment; belly to belly with mom; ear, shoulder hip alignment, long neck, chin / cheek touching cheek. Reviewed how baby can breathe at the breast. Tugging sensation, no pinching/pain.    Education on positioning and alignment. Mom is encouraged to:     - Bring baby up to the breast (use of pillows to elevate so baby's torso is against mom's breasts)   - Skin to skin for feedings with top hand exposed to show signs of satiation   - Chin deep into breast tissue (make baby look up to the nipple)   - nose aligned to the nipple   -Wait for wide gape, drag chin on the breast so nipple is aimed at the upper, back palate  - Cheek should be touching breast   - Deep, firm hold of baby with ear, shoulder, hip alignment    Demonstrated with teach back breast compressions during a feeding to increase milk transfer and stimulate suckling after a breathing/muscle break.     Feeding Plan     1. Meet early feeding cues  2. Use hand expression and nipple rolling techniques, Reverse pressure softening, to assist with nipple everting  3. Use massage, warmth, to stimulate breasts  4. Use pillows to bring baby to the breast (shoulders back, lower back support). Make sure you can see the latch.   5. Bring baby to breast skin to skin  6. Have baby's chest against mom's torso. Baby's chin should be deeply into the breast, and nose should touch the nipple. This position will  assist with deeper latch**  7. Place opposite hand under the breast and grab the breast like a taco. Your thumb should be in front of the baby's nose and behind the areola. Move baby not breast, and bring baby to breast when mouth is wide and deep latch is achieved.  8. Use breast compressions to stimulate suck  9. Once baby unlatches,  bring him up to your chest and practice burping techniques.   10. Move baby to the opposite breast and follow steps 1-8 to latch deeply.   11. Pump after each feed to stimulate breasts and have expressed milk for next feeding. Do not pump for more than 10 min.        Christy Hume, MA 8/28/2024 4:05 PM

## 2024-08-29 VITALS
HEIGHT: 63 IN | TEMPERATURE: 97.9 F | HEART RATE: 72 BPM | DIASTOLIC BLOOD PRESSURE: 74 MMHG | SYSTOLIC BLOOD PRESSURE: 125 MMHG | BODY MASS INDEX: 45.71 KG/M2 | OXYGEN SATURATION: 98 % | RESPIRATION RATE: 18 BRPM | WEIGHT: 258 LBS

## 2024-08-29 PROCEDURE — 99024 POSTOP FOLLOW-UP VISIT: CPT | Performed by: STUDENT IN AN ORGANIZED HEALTH CARE EDUCATION/TRAINING PROGRAM

## 2024-08-29 RX ORDER — BENZOCAINE/MENTHOL 6 MG-10 MG
1 LOZENGE MUCOUS MEMBRANE DAILY PRN
Start: 2024-08-29

## 2024-08-29 RX ORDER — ACETAMINOPHEN 325 MG/1
650 TABLET ORAL EVERY 4 HOURS PRN
Qty: 30 TABLET | Refills: 0 | Status: SHIPPED | OUTPATIENT
Start: 2024-08-29

## 2024-08-29 RX ORDER — SIMETHICONE 80 MG
80 TABLET,CHEWABLE ORAL 4 TIMES DAILY PRN
Start: 2024-08-29

## 2024-08-29 RX ORDER — IBUPROFEN 600 MG/1
600 TABLET, FILM COATED ORAL EVERY 6 HOURS
Qty: 30 TABLET | Refills: 0 | Status: SHIPPED | OUTPATIENT
Start: 2024-08-29

## 2024-08-29 RX ADMIN — DOCUSATE SODIUM 100 MG: 100 CAPSULE, LIQUID FILLED ORAL at 09:54

## 2024-08-29 RX ADMIN — HYDROCORTISONE 1 APPLICATION: 1 CREAM TOPICAL at 14:52

## 2024-08-29 RX ADMIN — BENZOCAINE AND LEVOMENTHOL 1 APPLICATION: 200; 5 SPRAY TOPICAL at 14:51

## 2024-08-29 RX ADMIN — IBUPROFEN 600 MG: 600 TABLET, FILM COATED ORAL at 12:44

## 2024-08-29 RX ADMIN — IBUPROFEN 600 MG: 600 TABLET, FILM COATED ORAL at 00:19

## 2024-08-29 RX ADMIN — IBUPROFEN 600 MG: 600 TABLET, FILM COATED ORAL at 06:10

## 2024-08-29 NOTE — PLAN OF CARE
Problem: PAIN - ADULT  Goal: Verbalizes/displays adequate comfort level or baseline comfort level  Description: Interventions:  - Encourage patient to monitor pain and request assistance  - Assess pain using appropriate pain scale  - Administer analgesics based on type and severity of pain and evaluate response  - Implement non-pharmacological measures as appropriate and evaluate response  - Consider cultural and social influences on pain and pain management  - Notify physician/advanced practitioner if interventions unsuccessful or patient reports new pain  8/29/2024 1448 by Latisha Mckeon RN  Outcome: Completed  8/29/2024 1112 by Latisha Mckeon RN  Outcome: Adequate for Discharge

## 2024-08-29 NOTE — CASE MANAGEMENT
Case Management Progress Note    Patient name Kelly Gabriel  Location /-01 MRN [id number]  : 1995 Date 2024       LOS (days): 2  Geometric Mean LOS (GMLOS) (days):   Days to GMLOS:        OBJECTIVE:        Current admission status: Inpatient  Preferred Pharmacy:   [id number] [place], [place] - [address] [address]  Phone: [phone] Fax: [phone]    Primary Care Provider: Ivelisse James DO    Primary Insurance: [email or web address]  Secondary Insurance:     PROGRESS NOTE:    CM received consult for MOB requesting for home use. Order placed to [facility] via [place]. [facility] is out of network.  CM provided paper prescription to patient for breast pump, patient request it be emailed to [facility], CM emailed and provided patient with paper copy.  No further case management needs.

## 2024-08-29 NOTE — LACTATION NOTE
This note was copied from a baby's chart.  CONSULT - LACTATION  Baby Boy Gabriel (Brandi) 2 days male MRN: [id number]    [facility] AN NURSERY Room / Bed: (N)/(N) Encounter: [id number]    Maternal Information     MOTHER:  Kelly Gabriel  Maternal Age: 28 y.o.  OB History: # 1 - Date: 2019, Sex: None, Weight: None, GA: 4w0d, Type: None, Apgar1: None, Apgar5: None, Living: None, Birth Comments: None    # 2 - Date: 08/27/24, Sex: Male, Weight: 3615 g (7 lb 15.5 oz), GA: 40w3d, Type: Vaginal, Spontaneous, Apgar1: 9, Apgar5: 9, Living: Living, Birth Comments: None   Previouse breast reduction surgery? No    Lactation history:   Has patient previously breast fed: No   How long had patient previously breast fed:     Previous breast feeding complications:       Past Surgical History:   Procedure Laterality Date    ELBOW SURGERY Left 1999    UPPER GASTROINTESTINAL ENDOSCOPY  2021    found hernia        Birth information:  YOB: 2026   Time of birth: 11:09 PM   Sex: male   Delivery type: Vaginal, Spontaneous   Birth Weight: 3615 g (7 lb 15.5 oz)   Percent of Weight Change: -3%     Gestational Age: 40w3d   [unfilled]    Assessment     Breast and nipple assessment: large breast       08/29/24 0900   Lactation Consultation   Reason for Consult 20 minutes   Lactation Consultant Total Time 20   Maternal Information   Has mother  before? No   Infant to breast within first hour of birth? Yes   Exclusive Pump and Bottle Feed No   LATCH Documentation   Latch 2   Audible Swallowing 2   Type of Nipple 2   Comfort (Breast/Nipple) 1   Hold (Positioning) 2   LATCH Score 9   Having latch problems? No   Position(s) Used Football   Breasts/Nipples   Left Breast Soft  (large breast)   Right Breast Soft  (large breast)   Left Nipple Tender;Everted   Right Nipple Tender;Everted   Intervention Hand expression   Breastfeeding Progress Not yet established   Breast Pump   Pump 3;1    Patient Follow-Up   Lactation Consult Status 2   Follow-Up Type Inpatient;Call as needed   Other OB Lactation Documentation    Additional Problem Noted Mom states breastfeeding is getting better. She is working on a deep latch with baby. Mom latched baby in football hold on right breast. Baby latched deeply. Baby actively sucking with swallows at breast. Reviewed cluster feedings and initial engorgement. Enc to call for latch assessment.       Feeding recommendations:  breast feed on demand  Mom states breastfeeding is getting better. She is working on a deep latch with baby. Mom latched baby in football hold on right breast. Baby latched deeply. Baby actively sucking with swallows at breast. Reviewed cluster feedings and initial engorgement. Enc to call for latch assessment.    Provided education of deep latch to breast by placing the nipple to the nose, dragging down to chin to achieve a wide latch. Bring baby to the breast, not breast to baby. Move your shoulders down and away from your ears. Look for ear, shoulder, hip alignment. Baby's upper and lower lip should be flanged on the breast.    C/O sore nipples.  Information given about sore nipples and how to correct with positioning techniques. Discussed maneuvers to latch infant on properly to avoid nipple pain and promote healing.  Discussed treatments that could be utilized to promote healing.     Discussed 2nd night syndrome and ways to calm infant. Hand out given. Information on hand expression given. Discussed benefits of knowing how to manually express breast including stimulating milk supply, softening nipple for latch and evacuating breast in the event of engorgement.    Discussed s/s engorgement, blocked milk ducts, and mastitis. Discussed how to remedy at home and when to contact physician. Reviewed engorgement and ways to reduce symptoms. Use a warmth on breasts with massage prior to feeding / pumping. Use massage during pumping over full ducts.  Used cold, compression on breasts after feeding/pumping session. Ideas are rice in a sock. Place one in the freezer for cool and one in the microwave for warmth. Referred to discharge book / engorgement page.    Encouraged parents to call for assistance, questions, and concerns about breastfeeding.  Extension provided.    Shana Patel MA 8/29/2024 9:13 AM

## 2024-08-29 NOTE — LACTATION NOTE
This note was copied from a baby's chart.  Follow up Lactation: Answered mom's questions about when to expect milk supply and how to deal with engorgement. Reviewed breast compressions, offering both breasts and outpatient resources.    Enc. To go to baby & me - sent EPIC chat - mom may decline if feedings are going well.    Baby is currently latched on the left breast in football hold. Mom has pillows to lift the baby and the breast to see the nipple/latch. Baby is in alignment. Enc. Cheeks to be touching the breast and enc. To compress between the shoulder blades.    Reassurance and encouragement provided    Milk Supply:   - Allow for non-nutritive suck at the breast to stimulate supply   - Allow for skin to skin during and after each breastfeeding session   - Use massage, heat, and hand expression prior to feedings to assist with deep latch   - Increase pumping sessions and pump after every feeding    Discussed s/s engorgement, blocked milk ducts, and mastitis. Discussed how to remedy at home and when to contact physician. Reviewed engorgement and ways to reduce symptoms. Use a warmth on breasts with massage prior to feeding / pumping. Use massage during pumping over full ducts. Used cold, compression on breasts after feeding/pumping session. Ideas are rice in a sock. Place one in the freezer for cool and one in the microwave for warmth. Referred to discharge book / engorgement page.    Demonstrated with teach back breast compressions during a feeding to increase milk transfer and stimulate suckling after a breathing/muscle break.     Nurse on demand: when baby gives hunger cues; when your breasts feel full, or at least every 3 hours during the day and every 5 hours at night counting from the beginning of one feeding to the beginning of the next; which ever comes first. When sucking and swallowing slow, gently compress the breast to restart flow. If active suck-swallow does not restart, gently remove the baby and  "offer the other breast; offering up to \"four\" breasts per feeding.    Education on creating a snug hold of your infant to the breast by verifying the infant's cheek is touching the breast, your infant's chin is deep into the breast tissue, your infant's arms are \"hugging\" the breast, and your infant's lips are flanged on the areola. Bring infant to the breast, not your breast to the infant. Latch should feel like a tugging sensation on the nipple.    Mom is encouraged to meet early feeding cues, allow for non-nutritive suck, use breast compressions, and monitor baby's output. Mom wants to review feeding logs to verify if baby is meeting daily output of wet and soiled diapers.       (Scan QR code for [employer] - positions)   Review [employer] on youtube or scan QR code for [employer] video      Mil[employer]        [employer] - positions    "

## 2024-08-29 NOTE — PLAN OF CARE
Problem: PAIN - ADULT  Goal: Verbalizes/displays adequate comfort level or baseline comfort level  Description: Interventions:  - Encourage patient to monitor pain and request assistance  - Assess pain using appropriate pain scale  - Administer analgesics based on type and severity of pain and evaluate response  - Implement non-pharmacological measures as appropriate and evaluate response  - Consider cultural and social influences on pain and pain management  - Notify physician/advanced practitioner if interventions unsuccessful or patient reports new pain  Outcome: Progressing     Problem: INFECTION - ADULT  Goal: Absence or prevention of progression during hospitalization  Description: INTERVENTIONS:  - Assess and monitor for signs and symptoms of infection  - Monitor lab/diagnostic results  - Monitor all insertion sites, i.e. indwelling lines, tubes, and drains  - Monitor endotracheal if appropriate and nasal secretions for changes in amount and color  - [place] appropriate cooling/warming therapies per order  - Administer medications as ordered  - Instruct and encourage patient and family to use good hand hygiene technique  - Identify and instruct in appropriate isolation precautions for identified infection/condition  Outcome: Progressing  Goal: Absence of fever/infection during neutropenic period  Description: INTERVENTIONS:  - Monitor WBC    Outcome: Progressing     Problem: SAFETY ADULT  Goal: Patient will remain free of falls  Description: INTERVENTIONS:  - Educate patient/family on patient safety including physical limitations  - Instruct patient to call for assistance with activity   - Consult OT/PT to assist with strengthening/mobility   - Keep Call bell within reach  - Keep bed low and locked with side rails adjusted as appropriate  - Keep care items and personal belongings within reach  - Initiate and maintain comfort rounds  - Make Fall Risk Sign visible to staff  Outcome: Progressing  Goal:  Maintain or return to baseline ADL function  Description: INTERVENTIONS:  -  Assess patient's ability to carry out ADLs; assess patient's baseline for ADL function and identify physical deficits which impact ability to perform ADLs (bathing, care of mouth/teeth, toileting, grooming, dressing, etc.)  - Assess/evaluate cause of self-care deficits   - Assess range of motion  - Assess patient's mobility; develop plan if impaired  - Assess patient's need for assistive devices and provide as appropriate  - Encourage maximum independence but intervene and supervise when necessary  - Involve family in performance of ADLs  - Assess for home care needs following discharge   - Consider OT consult to assist with ADL evaluation and planning for discharge  - Provide patient education as appropriate  Outcome: Progressing  Goal: Maintains/Returns to pre admission functional level  Description: INTERVENTIONS:  - Perform AM-PAC 6 Click Basic Mobility/ Daily Activity assessment daily.  - Set and communicate daily mobility goal to care team and patient/family/caregiver  - Out of bed for toileting  - Record patient progress and toleration of activity level   Outcome: Progressing     Problem: DISCHARGE PLANNING  Goal: Discharge to home or other facility with appropriate resources  Description: INTERVENTIONS:  - Identify barriers to discharge w/patient and caregiver  - Arrange for needed discharge resources and transportation as appropriate  - Identify discharge learning needs (meds, wound care, etc.)  - Arrange for interpretive services to assist at discharge as needed  - Refer to Case Management Department for coordinating discharge planning if the patient needs post-hospital services based on physician/advanced practitioner order or complex needs related to functional status, cognitive ability, or social support system  Outcome: Progressing     Problem: POSTPARTUM  Goal: Experiences normal postpartum course  Description:  INTERVENTIONS:  - Monitor maternal vital signs  - Assess uterine involution and lochia  Outcome: Progressing  Goal: Appropriate maternal -  bonding  Description: INTERVENTIONS:  - Identify family support  - Assess for appropriate maternal/infant bonding   -Encourage maternal/infant bonding opportunities  - Referral to  or  as needed  Outcome: Progressing  Goal: Establishment of infant feeding pattern  Description: INTERVENTIONS:  - Assess breast/bottle feeding  - Refer to lactation as needed  Outcome: Progressing  Goal: Incision(s), wounds(s) or drain site(s) healing without S/S of infection  Description: INTERVENTIONS  - Assess and document dressing, incision, wound bed, drain sites and surrounding tissue  - Provide patient and family education  Outcome: Progressing     Problem: Nutrition/Hydration-ADULT  Goal: Nutrient/Hydration intake appropriate for improving, restoring or maintaining nutritional needs  Description: Monitor and assess patient's nutrition/hydration status for malnutrition. Collaborate with interdisciplinary team and initiate plan and interventions as ordered.  Monitor patient's weight and dietary intake as ordered or per policy. Utilize nutrition screening tool and intervene as necessary. Determine patient's food preferences and provide high-protein, high-caloric foods as appropriate.     INTERVENTIONS:  - Monitor oral intake, urinary output, labs, and treatment plans  - Assess nutrition and hydration status and recommend course of action  - Evaluate amount of meals eaten  - Assist patient with eating if necessary   - Allow adequate time for meals  - Recommend/ encourage appropriate diets, oral nutritional supplements, and vitamin/mineral supplements  - Order, calculate, and assess calorie counts as needed  - Recommend, monitor, and adjust tube feedings and TPN/PPN based on assessed needs  - Assess need for intravenous fluids  - Provide specific nutrition/hydration  education as appropriate  - Include patient/family/caregiver in decisions related to nutrition  Outcome: Progressing     Problem: ALTERATION IN THE BREAST  Goal: Optimize infant feeding at the breast  Description: INTERVENTIONS:  - Latch, breast and nipple assessment  - Assess prior breast feeding history  - Hand expression of breast milk  - For cracked, bleeding and or sore nipples reassess latch, treat damaged nipple  -Educate mother on feeding cues  -Positioning/latch techniques  Outcome: Progressing     Problem: INADEQUATE LATCH, SUCK OR SWALLOW  Goal: Demonstrate ability to latch and sustain latch, audible swallowing and satiety  Description: INTERVENTIONS:  - Assess oral anatomy, notify Physician/AP for abnormal findings  - Establish milk expression  - Maximize feeding opportunity (skin to skin, behavioral state)  - Position/latch techniques  - Discourage use of pacifier-artificial nipple  - Mechanical pumping  - Nipple Shield  - Supplemental formula feeding (Physician/AP order)  - Alternative feeding method  Outcome: Progressing

## 2024-08-29 NOTE — PROGRESS NOTES
Progress Note - OB/GYN   Kelly Gabriel 28 y.o. female MRN: [id number]  Unit/Bed#: -01 Encounter: [id number]      Assessment/Plan    Kelly Gabriel is a 28 y.o.  who is PPD 2 s/p  at 40w3d     Preeclampsia without severe features  Assessment & Plan  CBC/CMP wnl, UPC 0.7    Systolic (12hrs), Av , Min:121 , Max:139   Diastolic (12hrs), Av, Min:65, Max:68        *  (spontaneous vaginal delivery)  Assessment & Plan    Recovering well   Routine postpartum care, encourage ambulation, encourage familial bonding  Lactation support for breast feeding as needed  Tolerating regular diet  Pain: Motrin/Tylenol around the clock  Appropriate bowel and bladder function   Postpartum Contraceptive plan: undecided, wants to discuss outpatient  DVT Ppx: Ambulation           Disposition: Anticipate discharge home postpartum Day #2  Barriers to discharge: none     Subjective/Objective     Subjective: Postpartum state. Denies headaches, visual changes, ruq/epigastric pain, swelling, SOB.     Pain: no  Tolerating PO: yes  Voiding: yes  Flatus: yes  Ambulating: yes  Breastfeeding: Breastfeeding  Chest pain: no  Shortness of breath: no  Leg pain: no  Lochia: wnl    Objective:     Vitals:  Vitals:    24 1205 24 1614 24 2119 24 0115   BP: 117/75 131/65 139/65 121/68   BP Location:   Right arm Right arm   Pulse: 85 98 99 80   Resp: 20 16 20 20   Temp: 98.5 °F (36.9 °C) 98.3 °F (36.8 °C) 97.9 °F (36.6 °C) 98.1 °F (36.7 °C)   TempSrc: Oral Oral Oral Oral   SpO2:  98% 98% 98%   Weight:       Height:           Physical Exam:   GEN: appears well, alert and oriented x 3, pleasant and cooperative   CV: Regular rate  RESP: non labored breathing  ABDOMEN: soft, no tenderness, no distention, Uterine fundus firm and non-tender, below the umbilicus  EXTREMITIES: non-tender  NEURO Alert and oriented to person, place, and time.       Lab Results   Component Value Date    WBC 13.43 (H) 2024    HGB 13.9  08/27/2024    HCT 41.0 08/27/2024    MCV 85 08/27/2024     08/27/2024         Lisa Lu MD  Obstetrics & Gynecology  08/29/24

## 2024-08-30 ENCOUNTER — TRANSITIONAL CARE MANAGEMENT (OUTPATIENT)
Age: 29
End: 2024-08-30

## 2024-08-30 PROCEDURE — 88307 TISSUE EXAM BY PATHOLOGIST: CPT | Performed by: PATHOLOGY

## 2024-09-04 ENCOUNTER — TELEPHONE (OUTPATIENT)
Dept: OBGYN CLINIC | Facility: CLINIC | Age: 29
End: 2024-09-04

## 2024-09-04 NOTE — TELEPHONE ENCOUNTER
----- Message from Luzmaria HUDDLESTON sent at 8/29/2024 12:21 PM EDT -----  Regarding: Postpartum Call  Delivered

## 2024-09-04 NOTE — TELEPHONE ENCOUNTER
Date of Delivery:  BOY: FELIX  Delivering Provider: [place]  Mode:   Delivery Notes/Complications: 1 degree, preE w/o SF  Breastfeeding/Formula/Both? breast  EPDS Score: 6 as of   postpartum visit scheduled? None yet

## 2024-10-03 ENCOUNTER — TELEPHONE (OUTPATIENT)
Age: 29
End: 2024-10-03

## 2024-10-03 NOTE — TELEPHONE ENCOUNTER
Pt called to schedule postpartum visit. She delivered on 8/27/24 with Dr. Stark. Reports no symptoms of concern. No available appts. Preference to be seen at [place] location. Thank you.

## 2024-10-08 ENCOUNTER — TELEPHONE (OUTPATIENT)
Age: 29
End: 2024-10-08

## 2024-10-08 PROBLEM — N84.1 CERVICAL POLYP: Status: RESOLVED | Noted: 2024-06-01 | Resolved: 2024-10-08

## 2024-10-08 PROBLEM — B95.1 POSITIVE GBS TEST: Status: RESOLVED | Noted: 2024-08-05 | Resolved: 2024-10-08

## 2024-10-08 PROBLEM — Z3A.40 40 WEEKS GESTATION OF PREGNANCY: Status: RESOLVED | Noted: 2024-02-13 | Resolved: 2024-10-08

## 2024-10-08 PROBLEM — E66.01 SEVERE OBESITY DUE TO EXCESS CALORIES AFFECTING PREGNANCY IN THIRD TRIMESTER (HCC): Status: RESOLVED | Noted: 2024-02-13 | Resolved: 2024-10-08

## 2024-10-08 PROBLEM — O99.210 OBESITY AFFECTING PREGNANCY, ANTEPARTUM, UNSPECIFIED OBESITY TYPE: Status: RESOLVED | Noted: 2024-05-29 | Resolved: 2024-10-08

## 2024-10-08 PROBLEM — O99.213 SEVERE OBESITY DUE TO EXCESS CALORIES AFFECTING PREGNANCY IN THIRD TRIMESTER (HCC): Status: RESOLVED | Noted: 2024-02-13 | Resolved: 2024-10-08

## 2024-10-08 NOTE — TELEPHONE ENCOUNTER
Patient called and canceled post partum appointment for today. She stated she would like to be scheduled with Dr. Stark.

## 2024-10-09 ENCOUNTER — POSTPARTUM VISIT (OUTPATIENT)
Dept: OBGYN CLINIC | Facility: CLINIC | Age: 29
End: 2024-10-09
Payer: COMMERCIAL

## 2024-10-09 VITALS — WEIGHT: 233 LBS | DIASTOLIC BLOOD PRESSURE: 80 MMHG | BODY MASS INDEX: 41.27 KG/M2 | SYSTOLIC BLOOD PRESSURE: 112 MMHG

## 2024-10-09 DIAGNOSIS — O14.03 MILD PRE-ECLAMPSIA IN THIRD TRIMESTER: ICD-10-CM

## 2024-10-09 NOTE — PROGRESS NOTES
Kelly Gabriel  1995    S:  29 y.o. female here for postpartum visit.  She is s/p Uncomplicated vaginal delivery on 8/27/24.       Gender: male   Apgars: 9/9  Weight: 7#15.5oz  Her lochia has resolved.    She is Breastfeeding and Bottle feeding without problems.   She is eating normally, denies constipation, diarrhea, urinary dysfunction.   She denies postpartum blues/depression.  Though she has been anxious recently due to life events. Her EPDS is 16. She denies SI/HI. She would not like to discuss therapy or treatment at this time.      Last Pap: NILM/HPV neg 2/15/24    We discussed contraceptive options including birth control pills, patches, NuvaRing, DepoProvera, Mirena/Kyleena/Copper IUD, Nexplanon.  At this point she would like to use condoms    O:   /80 (BP Location: Left arm, Patient Position: Sitting, Cuff Size: Large)   Wt 106 kg (233 lb)   LMP 11/27/2023 (Exact Date)   Breastfeeding No Comment: Pumping  BMI 41.27 kg/m²   She appears well and in no distress  Breasts are soft, nontender, without erythema  Abdomen is soft and nontender  External genitals are normal  Vagina is normal    A/P:  Postpartum visit.   She will return in 3-4 months for her yearly exam, sooner PRN.

## 2024-10-09 NOTE — PROGRESS NOTES
Pt is here for postpartum exam   No concerns at this time    Vaginal Delivery on 8/27  Baby Boy  7lbs 15.5oz  APGARS 9/9   Exclusively Pumping   No Vaginal Bleeding, occasional spotting   Contraception: non hormonal options     EPDS 16

## 2024-11-25 ENCOUNTER — OFFICE VISIT (OUTPATIENT)
Age: 29
End: 2024-11-25
Payer: COMMERCIAL

## 2024-11-25 VITALS
BODY MASS INDEX: 41.67 KG/M2 | OXYGEN SATURATION: 99 % | HEIGHT: 63 IN | DIASTOLIC BLOOD PRESSURE: 80 MMHG | SYSTOLIC BLOOD PRESSURE: 118 MMHG | WEIGHT: 235.2 LBS | RESPIRATION RATE: 16 BRPM | HEART RATE: 97 BPM | TEMPERATURE: 98.7 F

## 2024-11-25 DIAGNOSIS — O92.29 POSTPARTUM NIPPLE PAIN: Primary | ICD-10-CM

## 2024-11-25 PROCEDURE — 99213 OFFICE O/P EST LOW 20 MIN: CPT

## 2024-11-25 NOTE — PROGRESS NOTES
ok    Message text         Name: Kelly Gabriel      : 1995      MRN: [id number]  Encounter Provider: Julissa Renee PA-C  Encounter Date: 2024   Encounter department: [facility]  :  Assessment & Plan  Postpartum nipple pain  No evidence of infection.  Breast exam unremarkable.  Patient is no longer breast-feeding. Pain possibly secondary to cyclical hormonal changes. I recommend the patient take Tylenol as needed for pain and monitor her symptoms.  If symptoms worsen or if she develops bloody or purulent nipple discharge or sensation of new lumps or bumps in the breast, recommend that she follow-up with us or contact her OB/GYN.               History of Present Illness     Kelly presents to the office with a complaint of right breast pain for the past 2-3 days.  She gave her history of baby boy in August.  She breast-fed for the first 6 weeks, then stopped.  The breast pain is primarily located over the right nipple.  She applied pressure to the nipple a couple days ago to see if she could express anything and a mild amount of clear liquid came out, but no blood or pus.  She has not noticed any lumps or bumps of the breasts.  She has had 2 menstrual cycles since giving birth to her last menstrual period was on November 15.  No erythema or overlying skin changes over the breast.  No fever or chills.      Review of Systems   Constitutional:  Negative for chills and fever.   HENT:  Negative for congestion, ear pain and sore throat.    Eyes:  Negative for pain and visual disturbance.   Respiratory:  Negative for cough and shortness of breath.    Cardiovascular:  Negative for chest pain and palpitations.   Gastrointestinal:  Negative for abdominal pain, constipation, diarrhea and vomiting.   Genitourinary:  Negative for dysuria and hematuria.   Musculoskeletal:  Positive for myalgias (Breast pain). Negative for arthralgias and back pain.   Skin:  Negative for color change and rash.   Neurological:  Negative  "for dizziness, seizures, syncope and headaches.   All other systems reviewed and are negative.         Objective   /80 (BP Location: Right arm, Patient Position: Sitting, Cuff Size: Large)   Pulse 97   Temp 98.7 °F (37.1 °C) (Tympanic)   Resp 16   Ht 5' 3\" (1.6 m)   Wt 107 kg (235 lb 3.2 oz)   LMP  (LMP Unknown)   SpO2 99%   Breastfeeding No   BMI 41.66 kg/m²      Physical Exam  Vitals and nursing note reviewed.   Constitutional:       General: She is not in acute distress.     Appearance: She is well-developed.   Eyes:      Extraocular Movements: Extraocular movements intact.      Conjunctiva/sclera: Conjunctivae normal.   Cardiovascular:      Rate and Rhythm: Normal rate and regular rhythm.      Heart sounds: Normal heart sounds. No murmur heard.     No friction rub. No gallop.   Pulmonary:      Effort: Pulmonary effort is normal. No respiratory distress.      Breath sounds: Normal breath sounds. No wheezing, rhonchi or rales.   Chest:      Chest wall: No mass, swelling or tenderness.   Breasts:     Right: Tenderness present. No swelling, bleeding, inverted nipple, mass, nipple discharge or skin change.      Left: No swelling, bleeding, inverted nipple, mass, nipple discharge, skin change or tenderness.      Comments: Mild tenderness to palpation over the right nipple on examination.  No nipple discharge or inverted nipple.  Musculoskeletal:         General: No swelling.   Lymphadenopathy:      Upper Body:      Right upper body: No axillary or pectoral adenopathy.      Left upper body: No axillary or pectoral adenopathy.   Skin:     General: Skin is warm and dry.      Capillary Refill: Capillary refill takes less than 2 seconds.   Neurological:      General: No focal deficit present.      Mental Status: She is alert. Mental status is at baseline.   Psychiatric:         Mood and Affect: Mood normal.         Behavior: Behavior normal.         "

## 2025-02-06 ENCOUNTER — TELEMEDICINE (OUTPATIENT)
Age: 30
End: 2025-02-06
Payer: COMMERCIAL

## 2025-02-06 DIAGNOSIS — F32.A ANXIETY AND DEPRESSION: Primary | ICD-10-CM

## 2025-02-06 DIAGNOSIS — F41.9 ANXIETY AND DEPRESSION: Primary | ICD-10-CM

## 2025-02-06 DIAGNOSIS — Z13.220 LIPID SCREENING: ICD-10-CM

## 2025-02-06 PROCEDURE — 99214 OFFICE O/P EST MOD 30 MIN: CPT | Performed by: FAMILY MEDICINE

## 2025-02-06 RX ORDER — SERTRALINE HYDROCHLORIDE 25 MG/1
25 TABLET, FILM COATED ORAL DAILY
Qty: 30 TABLET | Refills: 0 | Status: SHIPPED | OUTPATIENT
Start: 2025-02-06 | End: 2025-08-05

## 2025-02-06 NOTE — PROGRESS NOTES
Virtual Regular Visit  Name: Kelly Gabriel      : 1995      MRN: [id number]  Encounter Provider: Ivelisse James DO  Encounter Date: 2025   Encounter department: [facility]      Verification of patient location:  Patient is located at Home in the following state in which I hold an active license [place] :  Assessment & Plan  Adjustment reaction with anxiety and depression    Orders:    sertraline (ZOLOFT) 25 mg tablet; Take 1 tablet (25 mg total) by mouth daily    Comprehensive metabolic panel; Future    Lipid Panel with Direct LDL reflex; Future    TSH, 3rd generation with Free T4 reflex; Future    Patient has a history of anxiety and depression that previously required therapy with Wellbutrin.  Since having her son there has been situational exacerbation of her symptoms. Admits to thoughts of self harm but no plan or attempt.   Last use of Wellbutrin patient states that most of her symptoms were not really improved.  Will switch to SSRI.  If needed we can always add Wellbutrin for synergistic effect.  Strongly encouraged patient to follow-up with behavioral health for therapy.  Referral placed psychiatry      Encounter provider Ivelisse James DO    The patient was identified by name and date of birth. Kelly Gabriel was informed that this is a telemedicine visit and that the visit is being conducted through Telephone.  My office door was closed. No one else was in the room.  She acknowledged consent and understanding of privacy and security of the video platform. The patient has agreed to participate and understands they can discontinue the visit at any time.    Patient is aware this is a billable service.     History of Present Illness     Pt presents to discuss anxiety and depression.   5 months post partum.   Hx of anxiety and depression that was previous treated with wellbutrin.       Review of Systems   Psychiatric/Behavioral:  Positive for dysphoric mood,  sleep disturbance and suicidal ideas. Negative for self-injury. The patient is nervous/anxious.        Objective   There were no vitals taken for this visit.        Visit Time  Total Visit Duration: 20 minutes

## 2025-02-28 ENCOUNTER — APPOINTMENT (OUTPATIENT)
Age: 30
End: 2025-02-28
Payer: COMMERCIAL

## 2025-02-28 ENCOUNTER — OFFICE VISIT (OUTPATIENT)
Age: 30
End: 2025-02-28
Payer: COMMERCIAL

## 2025-02-28 VITALS
DIASTOLIC BLOOD PRESSURE: 80 MMHG | OXYGEN SATURATION: 98 % | HEART RATE: 84 BPM | BODY MASS INDEX: 40.22 KG/M2 | RESPIRATION RATE: 18 BRPM | HEIGHT: 63 IN | WEIGHT: 227 LBS | TEMPERATURE: 97.9 F | SYSTOLIC BLOOD PRESSURE: 100 MMHG

## 2025-02-28 DIAGNOSIS — F32.A ANXIETY AND DEPRESSION: ICD-10-CM

## 2025-02-28 DIAGNOSIS — F41.9 ANXIETY AND DEPRESSION: ICD-10-CM

## 2025-02-28 DIAGNOSIS — Z13.220 LIPID SCREENING: ICD-10-CM

## 2025-02-28 DIAGNOSIS — N92.0 MENORRHAGIA WITH REGULAR CYCLE: ICD-10-CM

## 2025-02-28 DIAGNOSIS — Z00.00 ANNUAL PHYSICAL EXAM: Primary | ICD-10-CM

## 2025-02-28 PROBLEM — K21.9 GASTROESOPHAGEAL REFLUX DISEASE WITHOUT ESOPHAGITIS: Status: RESOLVED | Noted: 2021-05-18 | Resolved: 2025-02-28

## 2025-02-28 PROBLEM — O14.03 MILD PRE-ECLAMPSIA IN THIRD TRIMESTER: Status: RESOLVED | Noted: 2024-08-27 | Resolved: 2025-02-28

## 2025-02-28 LAB
ALBUMIN SERPL BCG-MCNC: 4.8 G/DL (ref 3.5–5)
ALP SERPL-CCNC: 54 U/L (ref 34–104)
ALT SERPL W P-5'-P-CCNC: 16 U/L (ref 7–52)
ANION GAP SERPL CALCULATED.3IONS-SCNC: 10 MMOL/L (ref 4–13)
AST SERPL W P-5'-P-CCNC: 20 U/L (ref 13–39)
BASOPHILS # BLD AUTO: 0.04 THOUSANDS/ÂΜL (ref 0–0.1)
BASOPHILS NFR BLD AUTO: 0 % (ref 0–1)
BILIRUB SERPL-MCNC: 0.54 MG/DL (ref 0.2–1)
BUN SERPL-MCNC: 16 MG/DL (ref 5–25)
CALCIUM SERPL-MCNC: 9.5 MG/DL (ref 8.4–10.2)
CHLORIDE SERPL-SCNC: 105 MMOL/L (ref 96–108)
CHOLEST SERPL-MCNC: 138 MG/DL (ref ?–200)
CO2 SERPL-SCNC: 25 MMOL/L (ref 21–32)
CREAT SERPL-MCNC: 0.79 MG/DL (ref 0.6–1.3)
EOSINOPHIL # BLD AUTO: 0.19 THOUSAND/ÂΜL (ref 0–0.61)
EOSINOPHIL NFR BLD AUTO: 2 % (ref 0–6)
ERYTHROCYTE [DISTWIDTH] IN BLOOD BY AUTOMATED COUNT: 12.1 % (ref 11.6–15.1)
FERRITIN SERPL-MCNC: 25 NG/ML (ref 11–307)
GFR SERPL CREATININE-BSD FRML MDRD: 100 ML/MIN/1.73SQ M
GLUCOSE SERPL-MCNC: 79 MG/DL (ref 65–140)
HCT VFR BLD AUTO: 43.9 % (ref 34.8–46.1)
HDLC SERPL-MCNC: 64 MG/DL
HGB BLD-MCNC: 14.2 G/DL (ref 11.5–15.4)
IMM GRANULOCYTES # BLD AUTO: 0.04 THOUSAND/UL (ref 0–0.2)
IMM GRANULOCYTES NFR BLD AUTO: 0 % (ref 0–2)
IRON SATN MFR SERPL: 36 % (ref 15–50)
IRON SERPL-MCNC: 138 UG/DL (ref 50–212)
LDLC SERPL CALC-MCNC: 64 MG/DL (ref 0–100)
LYMPHOCYTES # BLD AUTO: 2.74 THOUSANDS/ÂΜL (ref 0.6–4.47)
LYMPHOCYTES NFR BLD AUTO: 30 % (ref 14–44)
MCH RBC QN AUTO: 28.5 PG (ref 26.8–34.3)
MCHC RBC AUTO-ENTMCNC: 32.3 G/DL (ref 31.4–37.4)
MCV RBC AUTO: 88 FL (ref 82–98)
MONOCYTES # BLD AUTO: 0.57 THOUSAND/ÂΜL (ref 0.17–1.22)
MONOCYTES NFR BLD AUTO: 6 % (ref 4–12)
NEUTROPHILS # BLD AUTO: 5.45 THOUSANDS/ÂΜL (ref 1.85–7.62)
NEUTS SEG NFR BLD AUTO: 62 % (ref 43–75)
NRBC BLD AUTO-RTO: 0 /100 WBCS
PLATELET # BLD AUTO: 337 THOUSANDS/UL (ref 149–390)
PMV BLD AUTO: 9.9 FL (ref 8.9–12.7)
POTASSIUM SERPL-SCNC: 4.3 MMOL/L (ref 3.5–5.3)
PROLACTIN SERPL-MCNC: 8.29 NG/ML (ref 3.34–26.72)
PROT SERPL-MCNC: 7.4 G/DL (ref 6.4–8.4)
RBC # BLD AUTO: 4.98 MILLION/UL (ref 3.81–5.12)
SODIUM SERPL-SCNC: 140 MMOL/L (ref 135–147)
TIBC SERPL-MCNC: 379.4 UG/DL (ref 250–450)
TRANSFERRIN SERPL-MCNC: 271 MG/DL (ref 203–362)
TRIGL SERPL-MCNC: 48 MG/DL (ref ?–150)
TSH SERPL DL<=0.05 MIU/L-ACNC: 1.2 UIU/ML (ref 0.45–4.5)
UIBC SERPL-MCNC: 241 UG/DL (ref 155–355)
WBC # BLD AUTO: 9.03 THOUSAND/UL (ref 4.31–10.16)

## 2025-02-28 PROCEDURE — 80061 LIPID PANEL: CPT

## 2025-02-28 PROCEDURE — 99395 PREV VISIT EST AGE 18-39: CPT | Performed by: FAMILY MEDICINE

## 2025-02-28 PROCEDURE — 82728 ASSAY OF FERRITIN: CPT

## 2025-02-28 PROCEDURE — 84443 ASSAY THYROID STIM HORMONE: CPT

## 2025-02-28 PROCEDURE — 36415 COLL VENOUS BLD VENIPUNCTURE: CPT

## 2025-02-28 PROCEDURE — 85025 COMPLETE CBC W/AUTO DIFF WBC: CPT

## 2025-02-28 PROCEDURE — 99214 OFFICE O/P EST MOD 30 MIN: CPT | Performed by: FAMILY MEDICINE

## 2025-02-28 PROCEDURE — 83550 IRON BINDING TEST: CPT

## 2025-02-28 PROCEDURE — 84146 ASSAY OF PROLACTIN: CPT

## 2025-02-28 PROCEDURE — 83540 ASSAY OF IRON: CPT

## 2025-02-28 PROCEDURE — 80053 COMPREHEN METABOLIC PANEL: CPT

## 2025-02-28 NOTE — PATIENT INSTRUCTIONS
"Patient Education     Routine physical for adults   The Basics   Written by the doctors and editors at [facility]   What is a physical? -- A physical is a routine visit, or \"check-up,\" with your doctor. You might also hear it called a \"wellness visit\" or \"preventive visit.\"  During each visit, the doctor will:   Ask about your physical and mental health   Ask about your habits, behaviors, and lifestyle   Do an exam   Give you vaccines if needed   Talk to you about any medicines you take   Give advice about your health   Answer your questions  Getting regular check-ups is an important part of taking care of your health. It can help your doctor find and treat any problems you have. But it's also important for preventing health problems.  A routine physical is different from a \"sick visit.\" A sick visit is when you see a doctor because of a health concern or problem. Since physicals are scheduled ahead of time, you can think about what you want to ask the doctor.  How often should I get a physical? -- It depends on your age and health. In general, for people age 21 years and older:   If you are younger than 50 years, you might be able to get a physical every 3 years.   If you are 50 years or older, your doctor might recommend a physical every year.  If you have an ongoing health condition, like diabetes or high blood pressure, your doctor will probably want to see you more often.  What happens during a physical? -- In general, each visit will include:   Physical exam - The doctor or nurse will check your height, weight, heart rate, and blood pressure. They will also look at your eyes and ears. They will ask about how you are feeling and whether you have any symptoms that bother you.   Medicines - It's a good idea to bring a list of all the medicines you take to each doctor visit. Your doctor will talk to you about your medicines and answer any questions. Tell them if you are having any side effects that bother you. You " "should also tell them if you are having trouble paying for any of your medicines.   Habits and behaviors - This includes:   Your diet   Your exercise habits   Whether you smoke, drink alcohol, or use drugs   Whether you are sexually active   Whether you feel safe at home  Your doctor will talk to you about things you can do to improve your health and lower your risk of health problems. They will also offer help and support. For example, if you want to quit smoking, they can give you advice and might prescribe medicines. If you want to improve your diet or get more physical activity, they can help you with this, too.   Lab tests, if needed - The tests you get will depend on your age and situation. For example, your doctor might want to check your:   Cholesterol   Blood sugar   Iron level   Vaccines - The recommended vaccines will depend on your age, health, and what vaccines you already had. Vaccines are very important because they can prevent certain serious or deadly infections.   Discussion of screening - \"Screening\" means checking for diseases or other health problems before they cause symptoms. Your doctor can recommend screening based on your age, risk, and preferences. This might include tests to check for:   Cancer, such as breast, prostate, cervical, ovarian, colorectal, prostate, lung, or skin cancer   Sexually transmitted infections, such as chlamydia and gonorrhea   Mental health conditions like depression and anxiety  Your doctor will talk to you about the different types of screening tests. They can help you decide which screenings to have. They can also explain what the results might mean.   Answering questions - The physical is a good time to ask the doctor or nurse questions about your health. If needed, they can refer you to other doctors or specialists, too.  Adults older than 65 years often need other care, too. As you get older, your doctor will talk to you about:   How to prevent falling at " home   Hearing or vision tests   Memory testing   How to take your medicines safely   Making sure that you have the help and support you need at home  All topics are updated as new evidence becomes available and our peer review process is complete.  This topic retrieved from [employer] on: May 02, 2024.  Topic 975076 Version 1.0  Release: 32.4.3 - C32.122  © 2024 UpToDate, Inc. and/or its affiliates. All rights reserved.  Consumer Information Use and Disclaimer   Disclaimer: This generalized information is a limited summary of diagnosis, treatment, and/or medication information. It is not meant to be comprehensive and should be used as a tool to help the user understand and/or assess potential diagnostic and treatment options. It does NOT include all information about conditions, treatments, medications, side effects, or risks that may apply to a specific patient. It is not intended to be medical advice or a substitute for the medical advice, diagnosis, or treatment of a health care provider based on the health care provider's examination and assessment of a patient's specific and unique circumstances. Patients must speak with a health care provider for complete information about their health, medical questions, and treatment options, including any risks or benefits regarding use of medications. This information does not endorse any treatments or medications as safe, effective, or approved for treating a specific patient. UpToDate, Inc. and its affiliates disclaim any warranty or liability relating to this information or the use thereof.The use of this information is governed by the Terms of Use, available at https://www.[employer] Lum[employer]uwer.com/en/know/clinical-effectiveness-terms. 2024© UpToDate, Inc. and its affiliates and/or licensors. All rights reserved.  Copyright   © 2024 UpToDate, Inc. and/or its affiliates. All rights reserved.

## 2025-02-28 NOTE — PROGRESS NOTES
Adult Annual Physical  Name: Kelly Gabriel      : 1995      MRN: [id number]  Encounter Provider: Ivelisse James DO  Encounter Date: 2025   Encounter department: [facility] [place]    Assessment & Plan  Annual physical exam         Menorrhagia with regular cycle  New issues status post delivery of her child.  Will evaluate for iron deficiency secondary to heavy bleeding and will evaluate for possible prolactinoma as a etiology.  Orders:    Iron Panel (Includes Ferritin, Iron Sat%, Iron, and TIBC); Future    CBC and differential; Future    Prolactin; Future    Anxiety and depression  In partial remission was therapy.  Patient encouraged to seek out mental health counseling with her [employer] organization.  Depression Screening Follow-up Plan: Patient's depression screening was positive with a PHQ-2 score of 4. Their PHQ-9 score was 22. Patient assessed for underlying major depression. They have no active suicidal ideations. Brief counseling provided and recommend additional follow-up/re-evaluation next office visit.           Immunizations and preventive care screenings were discussed with patient today. Appropriate education was printed on patient's after visit summary.    Counseling:  Exercise: the importance of regular exercise/physical activity was discussed. Recommend exercise 3-5 times per week for at least 30 minutes.            History of Present Illness     Adult Annual Physical:  Patient presents for annual physical. Presents for annual   Discussed anxiety and depression   .     Diet and Physical Activity:  - Diet/Nutrition: portion control.  - Exercise: no formal exercise and 3-4 times a week on average.    Depression Screening:  - PHQ-2 Score: 4  - PHQ-9 Score: 22    General Health:  - Sleep: 4-6 hours of sleep on average.  - Hearing: normal hearing bilateral ears.  - Vision: wears glasses.  - Dental: no dental visits for > 1 year.    /GYN Health:  - Follows with  "GYN: no.   - Menopause: premenopausal.     Review of Systems   Constitutional:  Negative for fever.   Respiratory:  Negative for shortness of breath.    Cardiovascular:  Negative for chest pain.   Gastrointestinal:  Negative for diarrhea.   Genitourinary:  Positive for menstrual problem.   Neurological:  Negative for headaches.         Objective   /80 (BP Location: Left arm, Patient Position: Sitting, Cuff Size: Standard)   Pulse 84   Temp 97.9 °F (36.6 °C) (Tympanic)   Resp 18   Ht 5' 3\" (1.6 m)   Wt 103 kg (227 lb)   SpO2 98%   BMI 40.21 kg/m²     Physical Exam  Vitals and nursing note reviewed.   Constitutional:       General: She is not in acute distress.     Appearance: She is well-developed.   HENT:      Head: Normocephalic and atraumatic.      Right Ear: External ear normal.      Left Ear: External ear normal.   Eyes:      Extraocular Movements: Extraocular movements intact.      Conjunctiva/sclera: Conjunctivae normal.      Pupils: Pupils are equal, round, and reactive to light.   Cardiovascular:      Rate and Rhythm: Normal rate and regular rhythm.      Heart sounds: No murmur heard.  Pulmonary:      Effort: Pulmonary effort is normal. No respiratory distress.      Breath sounds: Normal breath sounds.   Abdominal:      Palpations: Abdomen is soft.      Tenderness: There is no abdominal tenderness.   Musculoskeletal:         General: No swelling.   Skin:     General: Skin is warm and dry.      Capillary Refill: Capillary refill takes less than 2 seconds.   Neurological:      Mental Status: She is alert and oriented to person, place, and time.      Gait: Gait normal.   Psychiatric:         Attention and Perception: Attention normal.         Mood and Affect: Mood is depressed.         Behavior: Behavior is cooperative.         Thought Content: Thought content does not include suicidal ideation.         "

## 2025-03-03 ENCOUNTER — RESULTS FOLLOW-UP (OUTPATIENT)
Age: 30
End: 2025-03-03

## 2025-03-14 ENCOUNTER — TELEPHONE (OUTPATIENT)
Age: 30
End: 2025-03-14

## 2025-03-14 DIAGNOSIS — F32.A ANXIETY AND DEPRESSION: ICD-10-CM

## 2025-03-14 DIAGNOSIS — F41.9 ANXIETY AND DEPRESSION: ICD-10-CM

## 2025-03-14 NOTE — TELEPHONE ENCOUNTER
Patient called the RX Refill Line. Message is being forwarded to the office.     Patient stated last office visit DR was going to increase Sertraline to 50 mg Daily,patient needs refill please send to [facility] # [id number] - [place] - [address] [phone]     Any questions Please contact patient at [phone]

## 2025-04-16 ENCOUNTER — OFFICE VISIT (OUTPATIENT)
Age: 30
End: 2025-04-16
Payer: COMMERCIAL

## 2025-04-16 VITALS — WEIGHT: 223 LBS | BODY MASS INDEX: 39.5 KG/M2 | SYSTOLIC BLOOD PRESSURE: 114 MMHG | DIASTOLIC BLOOD PRESSURE: 82 MMHG

## 2025-04-16 DIAGNOSIS — Z30.09 COUNSELING FOR BIRTH CONTROL REGARDING INTRAUTERINE DEVICE (IUD): Primary | ICD-10-CM

## 2025-04-16 PROCEDURE — 99213 OFFICE O/P EST LOW 20 MIN: CPT

## 2025-04-16 NOTE — PATIENT INSTRUCTIONS
BIRTH CONTROL OPTIONS     There are a number of methods available to help prevent pregnancy.    Hormonal methods of birth control (contraception) contain either estrogen and progestin or progestin only; they are a safe and reliable way to prevent pregnancy for most people. Hormonal methods include an implant, an intrauterine device (IUD), injections, pills, vaginal rings, and skin patches.    This document discusses the various hormonal methods of birth control that are available.     CHOOSING A BIRTH CONTROL METHOD     It can be difficult to decide which birth control method is best due to the variety of options available. The best method is one that will be used consistently and does not cause bothersome side effects. Other factors to consider include:    ?Efficacy (how well it works to prevent pregnancy)  ?Convenience  ?How long the drug or device can be used  ?Whether and how it affects your monthly period  ?Type and frequency of side effects  ?Affordability  ?Privacy concerns  ?Whether or not it also protects against sexually transmitted diseases  ?How quickly your fertility will return if you stop taking it    You should also think about whether you are comfortable remembering to take a pill every day, whether you want to involve your partner(s) in the decision, and whether and when you might want to get pregnant in the future. No birth control is perfect; you must balance the advantages and disadvantages of the different options and decide which method is best for you.    BIRTH CONTROL IMPLANT     The implant (brand name: Nexplanon) is a small lesly that contains the hormone progestin. It is inserted under the skin into the upper inner arm by a health care provider. It is effective for at least three years but can be removed earlier if you decide you want to get pregnant or simply prefer not to continue use of the implant. Insertion and removal can be done in an office or clinic.  The implant is one of the most  effective methods of birth control. It provides at least three years of protection from pregnancy as progestin is slowly absorbed into the surrounding tissues. Depending on when during the menstrual cycle the implant is placed, backup birth control (for instance condoms) may be recommended for one week following placement. Irregular bleeding is the most bothersome side effect. Fertility returns rapidly after the lesly is removed.    Side effects -- The most common side effects of the implant are irregular/unpredictable bleeding.    IUD WITH PROGESTIN     There are several intrauterine devices (IUDs) that contain a hormone called levonorgestrel (a type of progestin). One type (brand names: Liletta, Mirena) can be left in place for up to six years. The other options (brand names: Kyleena, Adeola) are somewhat smaller and can be left in place for up to five years (Kyleena) or three years (Adeola). All of the levonorgestrel IUDs are highly effective in preventing pregnancy.    Side effects -- Although irregular bleeding is common initially after progestin IUD placement, bleeding tends to diminish over time. With ongoing use, people using Mirena or Liletta often experience little or no bleeding. Those who use Kyleena or Adeola are more likely to continue having monthly periods.      INJECTABLE BIRTH CONTROL     The only injectable contraceptive currently available in the [place] is depot medroxyprogesterone acetate or DMPA (brand name: Depo-Provera). DMPA is injected deep into a muscle, such as in the buttock or upper arm, or injected subcutaneously (under the skin). With either type of injection, this contraceptive is given once every three months.    DMPA prevents ovulation and thickens the cervical mucus, making the cervix impenetrable to sperm. If you get your first dose of DMPA during the first seven days of your menstrual period, it prevents pregnancy immediately. If you get your first dose after the seventh day  "of your period, you should use a second form of birth control (eg, condoms) for seven days. DMPA is very effective, with a failure (pregnancy) rate of less than 1 percent when repeat injections are given on time.    Side effects -- The most common side effects of DMPA are irregular or prolonged bleeding and spotting, particularly during the first few months of use. Up to 50 percent of people completely stop having menstrual periods (doctors call this \"amenorrhea\") after one year of DMPA use. Monthly periods generally return within six months of the last DMPA injection, although, in some cases, it may take longer for periods to return. Some people gain weight while they are getting DMPA injections.    In people who get the DMPA shot, there is no increased risk of cardiovascular complications or cancer. Use of DMPA is associated with decreased bone mineral density; however, this effect is mostly or completely reversed after stopping the injections. Studies have not shown an increased risk of bone fractures in people who have used DMPA in the past.    Because DMPA is long-acting, it may not be ideal if you want to get pregnant shortly after stopping the medication. Although most people are able to conceive within 10 months, fertility may not return for up to 18 months after the last injection.    Benefits compared with birth control pills -- There are a number of people who prefer DMPA to the pill, including those who:    ?Have difficulty remembering to take a pill every day  ?Value the privacy with DMPA use  ?Cannot use estrogen  ?Also take seizure medications, which can be less effective with combination hormonal contraceptives.    Additional benefits of DMPA include a decreased risk of uterine cancer and pelvic inflammatory disease.    BIRTH CONTROL PILLS   Most oral contraceptives, commonly called \"the pill,\" contain a combination of estrogen and progestin. The combination pill reduces the risk of pregnancy " "by:    ?Preventing ovulation  ?Keeping the mucus in the cervix thick and impenetrable to sperm  ?Keeping the lining of the uterus thin    The pill makes menstrual bleeding more regular, with fewer days of flow and overall lighter flow. Other benefits of the pill include a reduction in:    ?Menstrual cramps or pain  ?Risk of ovarian cancer or cancer of the endometrium (uterine lining)  ?Acne  ?Iron-deficiency anemia (a low blood count due to low iron levels)    One potential downside of the pill is that, in order to maximize efficacy, you have to remember to take it every day, ideally at the same time of day. Some people find this difficult or inconvenient.    Efficacy -- When taken properly, birth control pills are a highly effective form of contraception; however, skipping pills or forgetting to restart the pill after the week of your period will increase risk of pregnancy. Approximately 9 out of every 100 people who take birth control pills for one year will have an unintended pregnancy.    Missed pills are a common cause of pregnancy. In general, if you forget to take an active pill (containing hormones), you should take it as soon as possible and take the next one at the usual time it is due. If you miss more than two pills, use a backup method of birth control (eg, condoms) for seven days.    Side effects -- Possible side effects of the pill include:    ?Nausea, breast tenderness, bloating, and mood changes - These typically improve within two to three months without treatment (while continuing the pill).    ?Irregular bleeding - Irregular bleeding, also called \"breakthrough bleeding\" or \"spotting,\" is particularly common during the first few months of taking the pill. It almost always resolves without any treatment within two to three months. Forgetting a pill can also cause breakthrough bleeding.    Taking birth control pills does not cause weight gain.    Potential complications -- When the pill was first " "introduced in the 1960s, the doses of both hormones (estrogen and progestin) were quite high. Because of this, cardiovascular complications occurred, such as high blood pressure, heart attacks, strokes, and blood clots in the legs and lungs.    The pills prescribed today have much lower doses of progestin and estrogen, which has decreased the risk of these complications. As a result, birth control pills are now considered a reliable and safe option for most healthy, nonsmoking people. While there is a very small risk of blood clots, this risk is actually lower than the risk during pregnancy or soon after giving birth.  Experts have studied the possible association between taking the pill and the risk of breast cancer. While these studies have had mixed results, there is some evidence that people who take the pill do have a slightly higher risk of getting breast cancer later in life than those who do not. However, if there is an increase in risk, it is very small, especially in younger people. It is important to balance this against the benefits of the pill, which include not only pregnancy prevention but a sizable reduction in the risk of ovarian and endometrial cancer.     Who should not take the pill? -- Because of an increased risk of complications, you should not take the pill if you:    ?Are 35 or older and smoke cigarettes (as this puts you at high risk for cardiovascular complications such as blood clots or heart attack).    ?Could be pregnant.    ?Have had blood clots or a stroke in the past (as this increases your risk of blood clots while taking the pill).    ?Have a history of an \"estrogen-dependent\" tumor (eg, breast or uterine cancer).    ?Have abnormal or unexplained menstrual bleeding (in which case the cause of the bleeding should be investigated before starting the pill).    ?Have active liver disease (the pill could worsen the liver disease).    ?Have migraine headaches associated with certain " visual or other neurologic symptoms (eg, aura), which increases your risk of stroke.    If you are taking the pill, tell your health care provider right away if you experience abdominal pain, chest pain, severe headaches, eye problems, or severe leg pain. These could be symptoms of several serious conditions including heart attack, blood clot, stroke, and liver or gallbladder disease.    Some people may take the pill under certain circumstances but need close monitoring. Talk with your doctor or nurse if you:    ?Have high blood pressure - You may experience a further increase in blood pressure and should be monitored more frequently while on the pill.    ?Take certain medication for seizures (epilepsy) - In this case, the pill may be slightly less effective in preventing pregnancy because the seizure medicines change the way it is metabolized.    ?Have diabetes mellitus - People with diabetes and kidney disease or vascular complications from diabetes should not use the pill.    Medication interactions -- The pill may not work as well to prevent pregnancy if you also take certain other medications.    Anticonvulsants -- Some anticonvulsants, including phenytoin (sample brand names: Dilantin, Phenytek), carbamazepine (sample brand names: Carbatrol, Tegretol), barbiturates, primidone (brand name: Mysoline), topiramate (sample brand name: Topamax), and oxcarbazepine (sample brand name: Trileptal), decrease the effectiveness of birth control pills as well as patches, vaginal rings, and the implant. As a result, people who take these anticonvulsants are advised to avoid hormonal birth control methods (with the exception of depot medroxyprogesterone acetate or DMPA [brand name: Depo-Provera] and intrauterine devices [IUDs] with progestin).     Other anticonvulsants do not appear to reduce contraceptive efficacy, including gabapentin (sample brand names: Gralise, Neurontin), lamotrigine (sample brand names: Lamictal,  "Subvenite), levetiracetam (sample brand names: Keppra, Roweepra), tiagabine (brand name: Gabitril), and valproic acid (brand name: Depakote). However, there is some concern that oral contraceptives may reduce the effectiveness of lamotrigine, potentially increasing the risk of seizures.    If you take any anti-seizure medications, it is important to talk with your health care provider about possible interactions before starting the pill or another hormonal birth control method.  Antibiotics -- Rifampin, which is sometimes used to treat tuberculosis, can decrease the efficacy of hormonal birth control. As a result, people who take rifampin are advised to avoid most hormonal birth control methods, with the exception of DMPA (brand name: Depo-Provera) and IUDs with progestin. Alternative options include a copper IUD, condoms, or a diaphragm, or tubal ligation (permanent birth control).     Contrary to popular belief, other (more commonly used) antibiotics do not affect the efficacy of hormonal birth control methods. Backup contraception is not needed when you take these antibiotics.    Darrick's Wort -- Darrick's wort, an herbal supplement sometimes taken to treat depression, may reduce the effectiveness of birth control pills, and possibly the patch and ring.    Starting the pill -- Ideally, you should start taking the pill on the first day of your period. This provides protection from pregnancy beginning immediately.    As long as you are sure you are not pregnant (which can be confirmed with a urine pregnancy test), it is also an option to start the pill as soon as your doctor prescribes it, regardless of where you are in your menstrual cycle. This is called the \"quick start\" method. If you do this, you will need to use a backup form of birth control (eg, condoms) for the first seven days after the quick start.    Many people start taking the pill on the first Sunday after their period starts (because most pill " "packs are arranged for a Sunday start). If you do this, you will also need to use some form of backup contraception (eg, condoms) for the first seven days after the Sunday start.    When to expect bleeding -- Traditionally, the pill is taken on a 28-day cycle that includes 21 days of hormone pills followed by 7 days of placebo pills (\"no hormone pills\") that do not contain hormones. Newer formulations have a longer duration of hormone pills (eg, 24 days) and fewer days of placebo pills (eg, 4 days). It is not necessary to take the placebo pills as they do not contain any active ingredients, but many people find it easier to stay on schedule when they continue to take a daily pill throughout the entire 28-day cycle.    Bleeding should occur during the fourth week of the pill pack (ie, the week that you are taking placebo pills or no pills). However, some people have irregular breakthrough bleeding or spotting in the first few months.     Continuous dosing -- Some people prefer to take hormone-containing birth control pills continuously, without the week of no pills or placebo pills. This allows you to control whether and when you have a monthly period. This regimen may be a good option if you have painful periods, endometriosis (a condition that causes pelvic pain), or bothersome premenstrual symptoms, including mood changes.    Traditional birth control pill packs can be dosed continuously to get rid of monthly bleeding. To do this, you take the first three weeks of a pill pack, then immediately start a new pack the next day (without taking a break or taking the placebo pills). This can be continued for as long as desired. A pill called Seasonale was specifically designed for continuous dosing. You take an active pill every day for 12 weeks, followed by seven days of placebo pills. With this regimen, you only experience bleeding once every three months. Seasonique it is similar; it contains 84 days of active pills " "and 7 days of low-dose estrogen pills. The addition of low-dose estrogen pills is intended to reduce breakthrough bleeding and possibly other symptoms, such as mood changes and headaches. Both are available as generic medications that work in the same way.    Over time, using continuous-dosing regimens results in fewer bleeding episodes per year (or no bleeding at all); however, many people experience unpredictable breakthrough bleeding when starting a continuous-dosing regimen. Breakthrough bleeding is inconvenient but does not mean that the pills are less effective (assuming you are taking them at the same time each day and not skipping any active pills).    Progestin-only pills -- Some pills contain only progestin (sometimes called the \"mini pill\"); these may be an option for people who cannot or should not take estrogen. This includes those who are breastfeeding or who have worsened migraines or high blood pressure with combination contraceptive pills. Progestin-only pills appear to be as effective as combination pills when taken at the same time every day, but they have a slightly higher failure rate if you are more than three hours late in taking them. A backup method of birth control should be used for seven days if you forget a pill or are more than three hours late in taking it. Breakthrough bleeding or spotting is common with both types of progestin-only pills.  Progestin-only pills are available in 28-day packs. Two types of progestin-only pills are available in the [place]. For one type (containing norethindrone), all 28 pills contain the hormone (ie, there is no \"placebo week\"). For the second type (containing drospirenone), each pack includes 24 hormone pills and 4 placebo pills. The drospirenone pill may be more effective than the norethindrone pill. A disadvantage of the drospirenone pill is that no generic is available, which may make it more expensive.    VAGINAL RINGS     These are flexible " plastic rings that contain estrogen and a progestin. The ring is inserted into the vagina, and the hormones are slowly absorbed into the body. This prevents pregnancy, similar to the pill. You keep the ring in your vagina for three weeks then leave it out for one week, during which you will experience bleeding. The following week, you insert a new ring or reinsert the previous one (one type of ring is reusable for approximately one year, while the other type needs to be discarded and replaced each month). As long as the ring remains in the vagina and is not uncomfortable, the ring's position inside the vagina is not important.    You can start using the vaginal ring anytime during your menstrual cycle. If you start it more than seven days after the first day of your last period, or if you are not sure when your last period started, you should use a backup method of contraception (eg, condoms) for the first seven days of inserting the ring.    Most people cannot feel the ring while it is in place, and in most cases, it is easy to insert and remove. It may be removed for a short time if desired, as discussed below, but should be left in during intercourse. Your partner most likely will not be able to feel the ring. You should use your fingers to check before and after sex to confirm the ring is in place. If the ring is left out for more than a few hours, you may be able to put it back in, or you may need to discard it, depending on which type of ring you use and where you are in your menstrual cycle. Because the instructions can vary, it is important to read the information that comes with your ring.    If you use the ring that gets replaced each month (brand names: NuvaRing, EluRyng):    ?During the first two weeks of your cycle, you can reinsert the ring and continue with the usual schedule. The ring should be rinsed in cool or warm (but not hot) water, without soap or detergent, before it is reinserted.    ?During  the third week, you can insert a new ring and begin a new cycle immediately, in which case you will not get a period. If the ring was previously in place for at least seven days in a row, you can also choose to leave the ring out for up to one week (during which you have your period) and then insert a new one.    ?Regardless of where you are in your cycle, if the ring is left out for more than three hours, you should use a backup method of birth control (eg, condoms) for the next seven days. Any backup method other than the female condom or diaphragm can be used.    If you use the reusable ring that gets reinserted each month (brand name: Annovera), the instructions are the same regardless of where you are in your menstrual cycle:    ?If the ring is out for two hours or less, you can reinsert it. Before doing so, wash the ring with mild soap, rinse with water, and gently dry.    ?If the ring is out for more than two hours, either continuously or cumulatively (eg, if you take it out two separate times that add up to more than two hours), you should also clean and insert the ring. However, you also must use a backup form of birth control (eg, condoms) for the next seven days.    As with the pill, in addition to being an effective method of preventing pregnancy, the vaginal ring also has other potential benefits. These include a reduction in menstrual cramps, iron-deficiency anemia, and the risk of certain cancers.    Risks and side effects are also similar to those of oral contraceptives.     BIRTH CONTROL SKIN PATCHES     Birth control skin patches (sample brand names: Xulane, Twirla) contain estrogen and progestin, similar to oral contraceptives. Both patches are similar to the pill in terms of efficacy in preventing pregnancy. Some people prefer patches because they do not require remembering to take a pill each day; on the other hand, some people do not like having a visible patch on their skin. Neither patch type  "should be used by people with a body mass index (BMI) of 30 kg/m2 or higher.    The patch is worn for one week on the shoulder, upper back, abdomen, or buttock; one type (brand name: Twirla) can also be worn on the upper arm. After one week, you remove the old patch and apply a new one; this is done for three weeks. During the fourth week, you do not apply a new patch; you will experience bleeding during this time.    The patch is ideally started on the first day of your period. This approach provides protection from pregnancy immediately. If you prefer, you can also start using the patch on the day it is prescribed, regardless of where you are in your menstrual cycle (called \"quick start\"). If you do this, you will need to use a backup form of birth control (eg, condoms) for the first seven days.  As with the pill, in addition to being an effective method of preventing pregnancy, the patch also likely has other potential benefits, although studies are limited. These include a reduction in menstrual cramps, iron-deficiency anemia, and the risk of certain cancers.     While efficacy is similar to the pill, the patch may deliver a higher overall dose of estrogen. Some studies found that this was associated with an approximate doubling of the risk of blood clots compared with use of oral contraceptives (the pill). However, other studies found no increase in risk compared with people using the pill. Further study is needed to define this risk.    PREGNANCY AFTER HORMONAL BIRTH CONTROL     The length of time it takes to become pregnant after use of a hormonal method of birth control depends upon which method was used, as well as some individual factors.  Most people return to their normal level of fertility within a cycle or two. For some, it may take several months before their cycle (including when they ovulate) becomes regular and they can get pregnant. This is more likely for people whose periods were irregular " before starting birth control. However, hormonal birth control does not increase the risk of infertility. In general:    ?People who use the pill, skin patch, or vaginal ring usually start ovulating regularly again within one to three months of stopping birth control.    ?With injectable depot medroxyprogesterone acetate (DMPA; brand name: Depo-Provera), return of fertility can be delayed. Approximately half of people who want to be pregnant are pregnant within 10 months of stopping DMPA. However, some people will not get their periods back for up to 18 months.    ?People who get an implant (eg, Nexplanon) or an intrauterine device (IUD) usually begin to ovulate again within one month after the device is removed.    EMERGENCY CONTRACEPTION     If you have unprotected sex or a problem with your birth control (for example, you miss a pill, your skin patch falls off, or your vaginal ring falls out), you can use emergency contraception to reduce your risk of pregnancy. There are two forms of emergency contraception, the copper intrauterine device (IUD) and pills. Emergency contraception should be taken as soon as possible after sex, ideally within 120 hours (five days). More information about this is available separately.     [email or web address]://[email or web address].com/contents/hormonal-methods-[email or web address]-birth-control-[email or web address]-the-basics

## 2025-04-16 NOTE — PROGRESS NOTES
Name: Kelly Gabriel      : 1995      MRN: [id number]  Encounter Provider: Ladonna Myers PA-C  Encounter Date: 2025   Encounter department: [facility] [place]  :  Assessment & Plan  Counseling for birth control regarding intrauterine device (IUD)    Discussed options for IUD contraceptives including benefits, risks and alternatives. Desires ParaGard IUD  Will try to insert at tail end of next menses.  No sex 2 weeks before insertion  UPT will be done on day of insertion.   Discussed with patient that periods may become heavier or more painful with the ParaGard IUD  Reviewed the procedure in detail including the risks and benefits      Return to office for annual exam/IUD insertion          History of Present Illness   HPI  Kelly Gabriel is a 29 y.o. female who presents to discuss birth control options specifically IUDs.    Patient states that she is interested in the ParaGard because she does not want any hormones.  She has never had an IUD before. she states that she has been on a birth control pill in the past but does not recall which one specifically.    Patient is currently sexually active  She is not on any birth control at this time  She had a  2024  LMP approximately 2025. Predictable every 28 days  Patient has no complaints or concerns regarding her menses    Vapes nicotine products, otherwise no contraindications for estrogen containing birth control options        Review of Systems  Current Outpatient Medications on File Prior to Visit   Medication Sig Dispense Refill    sertraline (ZOLOFT) 50 mg tablet Take 1 tablet (50 mg total) by mouth daily 90 tablet 1     No current facility-administered medications on file prior to visit.      Social History     Tobacco Use    Smoking status: Former     Current packs/day: 0.00     Average packs/day: 0.3 packs/day for 10.0 years (2.5 ttl pk-yrs)     Types: Cigarettes     Start date: 3/22/2009      Quit date: 3/22/2019     Years since quittin.0    Smokeless tobacco: Never   Vaping Use    Vaping status: Former    Start date: 3/1/2019    Quit date: 2023    Substances: Nicotine, Flavoring   Substance and Sexual Activity    Alcohol use: Not Currently     Comment: socially- very rarely.    Drug use: Never    Sexual activity: Yes     Partners: Male     Birth control/protection: None        Objective   /82 (BP Location: Left arm, Patient Position: Sitting, Cuff Size: Standard)   Wt 101 kg (223 lb)   LMP 2025 (Approximate)   BMI 39.50 kg/m²      Physical Exam  Constitutional:       Appearance: Normal appearance.   HENT:      Head: Normocephalic and atraumatic.      Nose: Nose normal.   Eyes:      Extraocular Movements: Extraocular movements intact.   Pulmonary:      Effort: Pulmonary effort is normal.   Musculoskeletal:         General: Normal range of motion.   Neurological:      Mental Status: She is alert.   Psychiatric:         Mood and Affect: Mood normal.         Behavior: Behavior normal.         Ladonna Myers PA-C   Hydroxychloroquine Pregnancy And Lactation Text: This medication has been shown to cause fetal harm but it isn't assigned a Pregnancy Risk Category. There are small amounts excreted in breast milk.

## 2025-04-18 ENCOUNTER — PROCEDURE VISIT (OUTPATIENT)
Age: 30
End: 2025-04-18
Payer: COMMERCIAL

## 2025-04-18 VITALS — BODY MASS INDEX: 40.21 KG/M2 | DIASTOLIC BLOOD PRESSURE: 78 MMHG | SYSTOLIC BLOOD PRESSURE: 110 MMHG | WEIGHT: 227 LBS

## 2025-04-18 DIAGNOSIS — Z30.430 ENCOUNTER FOR IUD INSERTION: Primary | ICD-10-CM

## 2025-04-18 LAB — SL AMB POCT URINE HCG: NORMAL

## 2025-04-18 PROCEDURE — 81025 URINE PREGNANCY TEST: CPT | Performed by: OBSTETRICS & GYNECOLOGY

## 2025-04-18 PROCEDURE — 58300 INSERT INTRAUTERINE DEVICE: CPT | Performed by: OBSTETRICS & GYNECOLOGY

## 2025-04-18 RX ORDER — COPPER 313.4 MG/1
INTRAUTERINE DEVICE INTRAUTERINE
Status: COMPLETED | OUTPATIENT
Start: 2025-04-18 | End: 2025-04-18

## 2025-04-18 RX ADMIN — COPPER: 313.4 INTRAUTERINE DEVICE INTRAUTERINE at 11:30

## 2025-04-18 NOTE — PROGRESS NOTES
Name: Kelly Gabriel      : 1995      MRN: [id number]  Encounter Provider: Meena Roche MD  Encounter Date: 2025   Encounter department: [facility]  :  Assessment & Plan  Encounter for IUD insertion  Reviewed SE, use, and efficacy with paragard  Orders:  •  POCT urine HCG  •  IUD Procedure    IUD Procedure    Date/Time: 2025 11:30 AM    Performed by: Meena Roche MD  Authorized by: Meena Roche MD    Verbal consent obtained?: Yes    Written consent obtained?: Yes    Risks and benefits: Risks, benefits and alternatives were discussed    Consent given by:  Patient  Patient states understanding of procedure being performed: Yes    Patient's understanding of procedure matches consent: Yes    Procedure consent matches procedure scheduled: Yes    Relevant documents present and verified: Yes    Test results available and properly labeled: Yes    Patient identity confirmed:  Verbally with patient  Select procedure: IUD insertion    IUD Insertion:     Pelvic exam performed: yes      Negative urine pregnancy test: yes      Cervix cleaned and prepped: yes      Speculum placed in vagina: yes      Tenaculum applied to cervix: yes      IUD inserted with no complications: yes      Strings trimmed: yes      Uterus sounded: yes      Uterus sound depth (cm):  9    IUD type:  Intrauterine copper  Post-procedure:     Patient tolerated procedure well: yes      Patient will follow up after next period: yes (she will return for string check)          History of Present Illness   HPI  Kelly Gabriel is a 29 y.o. female who presents for paragard IUD insertion  LMP beginning of April; no intercourse since then  Desires hormone free IUD    Pap : NILM, neg HPV        Review of Systems       Objective   /78 (BP Location: Left arm, Patient Position: Sitting, Cuff Size: Standard)   Wt 103 kg (227 lb)   LMP 2025 (Approximate)   BMI  40.21 kg/m²      Physical Exam  Vitals and nursing note reviewed.   Constitutional:       General: She is not in acute distress.  Pulmonary:      Effort: Pulmonary effort is normal. No respiratory distress.   Genitourinary:     General: Normal vulva.      Exam position: Lithotomy position.      Vagina: Normal.      Cervix: Normal.      Uterus: Normal.       Adnexa: Right adnexa normal and left adnexa normal.   Skin:     General: Skin is warm and dry.   Neurological:      Mental Status: She is alert. Mental status is at baseline.

## 2025-04-20 ENCOUNTER — TELEPHONE (OUTPATIENT)
Dept: OTHER | Facility: OTHER | Age: 30
End: 2025-04-20

## 2025-04-20 ENCOUNTER — NURSE TRIAGE (OUTPATIENT)
Dept: OTHER | Facility: OTHER | Age: 30
End: 2025-04-20

## 2025-04-20 NOTE — TELEPHONE ENCOUNTER
"FOLLOW UP: None    REASON FOR CONVERSATION: Contraception    SYMPTOMS: irritability, mood swings, 4/10 stomach pain    ESC sent to on call provider who advised-  Cramping is common after placement. nSAIDs should help. Mood swings and irritability are not caused by the copper IUD.     OTHER: copper IUD placed Friday    DISPOSITION: Call PCP Now      Reason for Disposition   [1] Caller has URGENT question AND [2] triager unable to answer question    Answer Assessment - Initial Assessment Questions  1. TYPE: \"What type of IUD do you have?\"       Copper    2. START DATE:  \"When was your IUD inserted?\" (e.g., date; weeks, months, years ago)       Friday    3. SYMPTOM: \"What is the main symptom (or question) you're concerned about?\"       Irritability, mood swings, 4/10 abdominal pain    4. ONSET: \"When did the  symptoms start?\"      Friday    5. VAGINAL BLEEDING: \"Are you having any unusual vaginal bleeding?\"       Denies    6. ABDOMEN OR PELVIC PAIN: \"Are you having any pain in your abdomen or pelvic area?\" (Scale: 0, 1-10; none, mild, moderate, severe)      4/10    7. FEVER: \"Is there a fever?\" If Yes, ask: \"What is the temperature, how was it measured, and when did it start?\"      None noted    Protocols used: Contraception - IUD Symptoms and Questions-Adult-    "

## 2025-04-20 NOTE — TELEPHONE ENCOUNTER
"Pt stated, \"I would like to make an appointment to take out my IUD.\"    Please call pt when office reopens.   "

## 2025-05-02 ENCOUNTER — TELEMEDICINE (OUTPATIENT)
Age: 30
End: 2025-05-02
Payer: COMMERCIAL

## 2025-05-02 ENCOUNTER — TELEPHONE (OUTPATIENT)
Dept: OTHER | Facility: OTHER | Age: 30
End: 2025-05-02

## 2025-05-02 DIAGNOSIS — F41.9 ANXIETY AND DEPRESSION: Primary | ICD-10-CM

## 2025-05-02 DIAGNOSIS — F32.A ANXIETY AND DEPRESSION: Primary | ICD-10-CM

## 2025-05-02 PROCEDURE — 99213 OFFICE O/P EST LOW 20 MIN: CPT | Performed by: FAMILY MEDICINE

## 2025-05-02 NOTE — PROGRESS NOTES
Virtual Regular VisitName: Kelly Gabriel      : 1995      MRN: [id number]  Encounter Provider: Ivelisse James DO  Encounter Date: 2025   Encounter department: [facility] [place]  :  Assessment & Plan  Anxiety and depression  Improving on SSRI. Pt to continue sertraline  50mg qd.              History of Present Illness     HPI  Review of Systems   Constitutional:  Negative for fever.   Cardiovascular:  Negative for chest pain.   Neurological:  Negative for headaches.   Psychiatric/Behavioral:  Positive for sleep disturbance (infant). Negative for suicidal ideas. The patient is not nervous/anxious.        Objective   LMP 2025 (Approximate)     Physical Exam  HENT:      Head: Normocephalic.   Eyes:      Conjunctiva/sclera: Conjunctivae normal.   Pulmonary:      Effort: Pulmonary effort is normal.   Neurological:      Mental Status: She is alert and oriented to person, place, and time.   Psychiatric:         Mood and Affect: Mood normal.         Behavior: Behavior normal.         Administrative Statements   Encounter provider Ivelisse James DO    The Patient is located at Home and in the following state in which I hold an active license [place].    The patient was identified by name and date of birth. Kelly Gabriel was informed that this is a telemedicine visit and that the visit is being conducted through the Epic Embedded platform. She agrees to proceed..  My office door was closed. No one else was in the room.  She acknowledged consent and understanding of privacy and security of the video platform. The patient has agreed to participate and understands they can discontinue the visit at any time.    I have spent a total time of 12 minutes in caring for this patient on the day of the visit/encounter including Risks and benefits of tx options, Instructions for management, Importance of tx compliance, and Risk factor reductions, not including the time spent for  establishing the audio/video connection.

## 2025-05-02 NOTE — TELEPHONE ENCOUNTER
Patient changed appointment to virtual today with Dr. James [id number].  Please call patient if this is not okay.  Thank you

## 2025-05-20 ENCOUNTER — TELEPHONE (OUTPATIENT)
Age: 30
End: 2025-05-20

## 2025-05-20 NOTE — TELEPHONE ENCOUNTER
Patient called the [facility] office in error to reschedule her OB appointment for today.  A soft transfer was completed to connect patient with the OB office.

## 2025-06-18 ENCOUNTER — TELEPHONE (OUTPATIENT)
Age: 30
End: 2025-06-18

## 2025-06-18 NOTE — TELEPHONE ENCOUNTER
Called Pt to offer an appt for TT w/ Rafaelina Reyes.  Pt states that is still interested in services, but is not able to schedule at this time, due to transportation. Writer offered outside resources, Pt not interested.

## 2025-08-01 ENCOUNTER — TELEPHONE (OUTPATIENT)
Age: 30
End: 2025-08-01

## 2025-08-01 ENCOUNTER — OFFICE VISIT (OUTPATIENT)
Age: 30
End: 2025-08-01
Payer: COMMERCIAL

## 2025-08-01 ENCOUNTER — APPOINTMENT (OUTPATIENT)
Age: 30
End: 2025-08-01
Payer: COMMERCIAL

## 2025-08-01 VITALS
HEART RATE: 79 BPM | RESPIRATION RATE: 18 BRPM | BODY MASS INDEX: 41.36 KG/M2 | WEIGHT: 233.4 LBS | SYSTOLIC BLOOD PRESSURE: 116 MMHG | TEMPERATURE: 97.9 F | DIASTOLIC BLOOD PRESSURE: 60 MMHG | OXYGEN SATURATION: 99 % | HEIGHT: 63 IN

## 2025-08-01 DIAGNOSIS — E66.01 MORBID OBESITY WITH BMI OF 40.0-44.9, ADULT (HCC): ICD-10-CM

## 2025-08-01 DIAGNOSIS — E66.01 MORBID OBESITY WITH BMI OF 40.0-44.9, ADULT (HCC): Primary | ICD-10-CM

## 2025-08-01 LAB — HCG SERPL QL: NEGATIVE

## 2025-08-01 PROCEDURE — 99213 OFFICE O/P EST LOW 20 MIN: CPT | Performed by: FAMILY MEDICINE

## 2025-08-01 PROCEDURE — 36415 COLL VENOUS BLD VENIPUNCTURE: CPT

## 2025-08-01 PROCEDURE — 84703 CHORIONIC GONADOTROPIN ASSAY: CPT

## 2025-08-01 RX ORDER — TIRZEPATIDE 2.5 MG/.5ML
2.5 INJECTION, SOLUTION SUBCUTANEOUS WEEKLY
Qty: 2 ML | Refills: 0 | Status: SHIPPED | OUTPATIENT
Start: 2025-08-01 | End: 2025-08-29